# Patient Record
Sex: FEMALE | Race: WHITE | NOT HISPANIC OR LATINO | Employment: FULL TIME | URBAN - METROPOLITAN AREA
[De-identification: names, ages, dates, MRNs, and addresses within clinical notes are randomized per-mention and may not be internally consistent; named-entity substitution may affect disease eponyms.]

---

## 2020-07-22 ENCOUNTER — TELEPHONE (OUTPATIENT)
Dept: INTERNAL MEDICINE | Facility: CLINIC | Age: 65
End: 2020-07-22

## 2020-07-22 ENCOUNTER — OFFICE VISIT (OUTPATIENT)
Dept: INTERNAL MEDICINE | Facility: CLINIC | Age: 65
End: 2020-07-22

## 2020-07-22 VITALS
BODY MASS INDEX: 27.66 KG/M2 | HEIGHT: 72 IN | TEMPERATURE: 98.4 F | SYSTOLIC BLOOD PRESSURE: 112 MMHG | DIASTOLIC BLOOD PRESSURE: 78 MMHG | HEART RATE: 68 BPM | WEIGHT: 204.2 LBS

## 2020-07-22 DIAGNOSIS — Z13.29 SCREENING FOR THYROID DISORDER: ICD-10-CM

## 2020-07-22 DIAGNOSIS — Z13.220 LIPID SCREENING: ICD-10-CM

## 2020-07-22 DIAGNOSIS — Z13.21 ENCOUNTER FOR VITAMIN DEFICIENCY SCREENING: Primary | ICD-10-CM

## 2020-07-22 DIAGNOSIS — Z79.890 HORMONE REPLACEMENT THERAPY: ICD-10-CM

## 2020-07-22 DIAGNOSIS — Z79.890 HORMONE REPLACEMENT THERAPY: Primary | ICD-10-CM

## 2020-07-22 DIAGNOSIS — R63.5 WEIGHT GAIN: ICD-10-CM

## 2020-07-22 PROCEDURE — 99204 OFFICE O/P NEW MOD 45 MIN: CPT | Performed by: PHYSICIAN ASSISTANT

## 2020-07-22 RX ORDER — ALBUTEROL SULFATE 90 UG/1
2 AEROSOL, METERED RESPIRATORY (INHALATION) AS NEEDED
COMMUNITY

## 2020-07-22 RX ORDER — CHOLECALCIFEROL (VITAMIN D3) 1MM UNIT/G
LIQUID (GRAM) MISCELLANEOUS
COMMUNITY
End: 2021-12-13

## 2020-07-22 RX ORDER — LANOLIN ALCOHOL/MO/W.PET/CERES
1000 CREAM (GRAM) TOPICAL
COMMUNITY
End: 2021-11-29

## 2020-07-22 NOTE — PATIENT INSTRUCTIONS

## 2020-07-22 NOTE — ASSESSMENT & PLAN NOTE
Cut back on assist foods, high caloric foods, and sweets.  Continue regular cardiovascular exercise.  Shoot for greater than 10,000 steps a day.

## 2020-07-22 NOTE — ASSESSMENT & PLAN NOTE
Patient reports she had extensive labs in September 2019.  She has been using a vaginal tablet that has been compounded with estrogen, testosterone, and progesterone.  She has the bottle with her today.  Reports improvement of fatigue and increased energy.

## 2020-07-22 NOTE — PROGRESS NOTES
Patient Care Team:  Ricarda Perez PA-C as PCP - General (Internal Medicine)    Chief Complaint::   Chief Complaint   Patient presents with   • Establish Care        Subjective     HPI  64 year old female presents to establish care. Sister of Keanu Wiggins-moved from [facility] in 10/2019.    Pt lives with Keanu. Works at same location.  Has not establish care with any other physician since she moved.  Her last Pap smear was in June with Anali Beal.  Her last mammogram was performed in [place] September 2019.  She has no history of abnormal mammograms.  Last bone density was 2019.  Per patient, colonoscopy last performed greater than 10 years ago.  She has no history of polyps.  She did have a Cologuard performed 3 years ago.  Past medical history significant for seasonal bronchitis, hormone replacement therapy for fatigue, and thyroid nodule.  Per patient, thyroid nodule has resolved with last imaging and biopsy.  She currently takes a compounded vaginal tablet nightly. Estrogen .15,test .75/prog20 vaginal tabs 30  She is wanting to switch this to [place] [facility].  She reports she has improvement in fatigue and more energy since starting this.  She has had a 20 pound weight gain in the past year.  She does wear an apple watch and records 8-10,000 steps a day.  She eats mostly whole food diet.              The following portions of the patient's history were reviewed and updated as appropriate: active problem list, medication list, allergies, family history, social history    Review of Systems:   Review of Systems   Constitutional: Positive for activity change and appetite change. Negative for chills, fatigue, fever, unexpected weight gain and unexpected weight loss.   HENT: Negative for congestion, ear pain and sinus pressure.    Eyes: Negative for blurred vision and double vision.   Respiratory: Negative for cough, chest tightness, shortness of breath and wheezing.    Cardiovascular: Negative  "for chest pain and palpitations.   Gastrointestinal: Negative for abdominal distention, abdominal pain, blood in stool, constipation, diarrhea and nausea.   Endocrine: Negative for cold intolerance and heat intolerance.   Genitourinary: Negative for amenorrhea, breast discharge and breast lump.   Musculoskeletal: Negative for arthralgias and gait problem.   Skin: Negative for color change.   Allergic/Immunologic: Positive for environmental allergies. Negative for food allergies.   Neurological: Negative for dizziness, speech difficulty and headache.   Psychiatric/Behavioral: Negative for decreased concentration and dysphoric mood. The patient is not nervous/anxious.        Vital Signs  Vitals:    07/22/20 1541   BP: 112/78   BP Location: Left arm   Patient Position: Sitting   Cuff Size: Adult   Pulse: 68   Temp: 98.4 °F (36.9 °C)   TempSrc: Temporal   Weight: 92.6 kg (204 lb 3.2 oz)   Height: 182.9 cm (72\")  Comment: PER PATIENT   PainSc: 0-No pain     Body mass index is 27.69 kg/m².    Labs  No results found for any previous visit.       Imaging  No radiology results for the last 30 days.      Current Outpatient Medications:   •  albuterol sulfate HFA (ProAir HFA) 108 (90 Base) MCG/ACT inhaler, PRN, Disp: , Rfl:   •  Cetirizine HCl (ZYRTEC PO), Take  by mouth Daily., Disp: , Rfl:   •  Cholecalciferol (VITAMIN D3) liquid, Takes 2500 Units  2 drops daily, Disp: , Rfl:   •  DHEA 10 MG capsule, Take 1 capsule by mouth Daily., Disp: , Rfl:   •  MAGNESIUM CITRATE PO, Take  by mouth. Takes 1  150 mg daily, Disp: , Rfl:   •  Multiple Vitamin (MULTIVITAMINS PO), Take  by mouth. womens 55 Plus  1 daily, Disp: , Rfl:   •  Multiple Vitamins-Minerals (EYE VITAMINS PO), Take  by mouth. Macuguard  1 daily, Disp: , Rfl:   •  Unable to find, 1 each. Estrao.15/Testo.75/ prog 20   1 tablet vaginally daily at bedtime, Disp: , Rfl:   •  vitamin B-12 (CYANOCOBALAMIN) 1000 MCG tablet, Take 1,000 mcg by mouth. On Mon-Wed-Fri, Disp: , Rfl: "     Physical Exam:    Physical Exam   Constitutional: She is oriented to person, place, and time. She appears well-developed and well-nourished.   HENT:   Head: Normocephalic and atraumatic.   Right Ear: Hearing, tympanic membrane, external ear and ear canal normal.   Left Ear: Hearing, tympanic membrane, external ear and ear canal normal.   Nose: Nose normal.   Mouth/Throat: Uvula is midline, oropharynx is clear and moist and mucous membranes are normal.   Eyes: Pupils are equal, round, and reactive to light. Conjunctivae, EOM and lids are normal.   Neck: Normal range of motion and full passive range of motion without pain. Neck supple.   Cardiovascular: Normal rate, regular rhythm, normal heart sounds and intact distal pulses.   Pulmonary/Chest: Effort normal and breath sounds normal.   Abdominal: Soft. Normal appearance and bowel sounds are normal.   Musculoskeletal: Normal range of motion.   Neurological: She is alert and oriented to person, place, and time. She has normal strength and normal reflexes.   Skin: Skin is warm, dry and intact.   Psychiatric: She has a normal mood and affect. Her speech is normal and behavior is normal. Judgment and thought content normal. Cognition and memory are normal.   Vitals reviewed.      Procedures        Assessment/Plan   Problem List Items Addressed This Visit        Other    Encounter for vitamin deficiency screening - Primary    Relevant Orders    Vitamin D 25 Hydroxy    Vitamin B12    Lipid screening    Relevant Orders    Lipid Panel    Screening for thyroid disorder    Current Assessment & Plan     History of thyroid nodule.         Relevant Orders    TSH    T4, Free    Weight gain    Current Assessment & Plan     Cut back on assist foods, high caloric foods, and sweets.  Continue regular cardiovascular exercise.  Shoot for greater than 10,000 steps a day.         Relevant Orders    CBC & Differential    Comprehensive Metabolic Panel    TSH    T4, Free    Hormone  replacement therapy    Current Assessment & Plan     Patient reports she had extensive labs in September 2019.  She has been using a vaginal tablet that has been compounded with estrogen, testosterone, and progesterone.  She has the bottle with her today.  Reports improvement of fatigue and increased energy.            Return in 6 weeks for to routine medical exam.  Fasting labs prior to appointment.  Will call pharmacy in [place] to see if compounding prescription can be transferred.  Discussed importance of low-fat, low-cholesterol, low calorie diet for weight loss.  Continue regular cardiovascular exercise.  We will try and get record of colonoscopy, DEXA, and previous mammograms.    Return in about 6 weeks (around 9/2/2020) for Recheck.    Plan of care reviewed with patient at the conclusion of today's visit. Education was provided regarding diagnosis, management, and any prescribed or recommended OTC medications.Patient verbalizes understanding of and agreement with management plan.       Ricarda Perez PA-C    Please note that portions of this note were completed with a voice recognition program. Efforts were made to edit the dictations, but occasionally words are mistranscribed.  Answers for HPI/ROS submitted by the patient on 7/20/2020   What is the primary reason for your visit?: Other  Please describe your symptoms.: I have no symptoms.  This is a wellness check.  Have you had these symptoms before?: No  How long have you been having these symptoms?: 1-4 days

## 2020-08-10 DIAGNOSIS — Z79.890 HORMONE REPLACEMENT THERAPY: ICD-10-CM

## 2020-08-28 ENCOUNTER — LAB (OUTPATIENT)
Dept: LAB | Facility: HOSPITAL | Age: 65
End: 2020-08-28

## 2020-08-28 DIAGNOSIS — Z13.220 LIPID SCREENING: ICD-10-CM

## 2020-08-28 DIAGNOSIS — R63.5 WEIGHT GAIN: ICD-10-CM

## 2020-08-28 DIAGNOSIS — Z13.21 ENCOUNTER FOR VITAMIN DEFICIENCY SCREENING: ICD-10-CM

## 2020-08-28 DIAGNOSIS — Z13.29 SCREENING FOR THYROID DISORDER: ICD-10-CM

## 2020-08-28 LAB
25(OH)D3 SERPL-MCNC: 39.8 NG/ML (ref 30–100)
ALBUMIN SERPL-MCNC: 4.9 G/DL (ref 3.5–5.2)
ALBUMIN/GLOB SERPL: 1.6 G/DL
ALP SERPL-CCNC: 55 U/L (ref 39–117)
ALT SERPL W P-5'-P-CCNC: 35 U/L (ref 1–33)
ANION GAP SERPL CALCULATED.3IONS-SCNC: 13.5 MMOL/L (ref 5–15)
AST SERPL-CCNC: 31 U/L (ref 1–32)
BASOPHILS # BLD AUTO: 0.05 10*3/MM3 (ref 0–0.2)
BASOPHILS NFR BLD AUTO: 1.2 % (ref 0–1.5)
BILIRUB SERPL-MCNC: 0.8 MG/DL (ref 0–1.2)
BUN SERPL-MCNC: 13 MG/DL (ref 8–23)
BUN/CREAT SERPL: 18.3 (ref 7–25)
CALCIUM SPEC-SCNC: 9.9 MG/DL (ref 8.6–10.5)
CHLORIDE SERPL-SCNC: 102 MMOL/L (ref 98–107)
CHOLEST SERPL-MCNC: 298 MG/DL (ref 0–200)
CO2 SERPL-SCNC: 24.5 MMOL/L (ref 22–29)
CREAT SERPL-MCNC: 0.71 MG/DL (ref 0.57–1)
DEPRECATED RDW RBC AUTO: 42.2 FL (ref 37–54)
EOSINOPHIL # BLD AUTO: 0.15 10*3/MM3 (ref 0–0.4)
EOSINOPHIL NFR BLD AUTO: 3.7 % (ref 0.3–6.2)
ERYTHROCYTE [DISTWIDTH] IN BLOOD BY AUTOMATED COUNT: 12.1 % (ref 12.3–15.4)
GFR SERPL CREATININE-BSD FRML MDRD: 83 ML/MIN/1.73
GLOBULIN UR ELPH-MCNC: 3.1 GM/DL
GLUCOSE SERPL-MCNC: 79 MG/DL (ref 65–99)
HCT VFR BLD AUTO: 43.3 % (ref 34–46.6)
HDLC SERPL-MCNC: 80 MG/DL (ref 40–60)
HGB BLD-MCNC: 14.8 G/DL (ref 12–15.9)
IMM GRANULOCYTES # BLD AUTO: 0.01 10*3/MM3 (ref 0–0.05)
IMM GRANULOCYTES NFR BLD AUTO: 0.2 % (ref 0–0.5)
LDLC SERPL CALC-MCNC: 202 MG/DL (ref 0–100)
LDLC/HDLC SERPL: 2.52 {RATIO}
LYMPHOCYTES # BLD AUTO: 1.18 10*3/MM3 (ref 0.7–3.1)
LYMPHOCYTES NFR BLD AUTO: 28.9 % (ref 19.6–45.3)
MCH RBC QN AUTO: 32.7 PG (ref 26.6–33)
MCHC RBC AUTO-ENTMCNC: 34.2 G/DL (ref 31.5–35.7)
MCV RBC AUTO: 95.6 FL (ref 79–97)
MONOCYTES # BLD AUTO: 0.29 10*3/MM3 (ref 0.1–0.9)
MONOCYTES NFR BLD AUTO: 7.1 % (ref 5–12)
NEUTROPHILS NFR BLD AUTO: 2.4 10*3/MM3 (ref 1.7–7)
NEUTROPHILS NFR BLD AUTO: 58.9 % (ref 42.7–76)
NRBC BLD AUTO-RTO: 0.2 /100 WBC (ref 0–0.2)
PLATELET # BLD AUTO: 199 10*3/MM3 (ref 140–450)
PMV BLD AUTO: 11 FL (ref 6–12)
POTASSIUM SERPL-SCNC: 3.8 MMOL/L (ref 3.5–5.2)
PROT SERPL-MCNC: 8 G/DL (ref 6–8.5)
RBC # BLD AUTO: 4.53 10*6/MM3 (ref 3.77–5.28)
SODIUM SERPL-SCNC: 140 MMOL/L (ref 136–145)
T4 FREE SERPL-MCNC: 1.22 NG/DL (ref 0.93–1.7)
TRIGL SERPL-MCNC: 81 MG/DL (ref 0–150)
TSH SERPL DL<=0.05 MIU/L-ACNC: 1.57 UIU/ML (ref 0.27–4.2)
VIT B12 BLD-MCNC: 726 PG/ML (ref 211–946)
VLDLC SERPL-MCNC: 16.2 MG/DL (ref 5–40)
WBC # BLD AUTO: 4.08 10*3/MM3 (ref 3.4–10.8)

## 2020-08-28 PROCEDURE — 82306 VITAMIN D 25 HYDROXY: CPT

## 2020-08-28 PROCEDURE — 84443 ASSAY THYROID STIM HORMONE: CPT

## 2020-08-28 PROCEDURE — 85025 COMPLETE CBC W/AUTO DIFF WBC: CPT

## 2020-08-28 PROCEDURE — 82607 VITAMIN B-12: CPT

## 2020-08-28 PROCEDURE — 84439 ASSAY OF FREE THYROXINE: CPT

## 2020-08-28 PROCEDURE — 80061 LIPID PANEL: CPT

## 2020-08-28 PROCEDURE — 80053 COMPREHEN METABOLIC PANEL: CPT

## 2020-08-30 DIAGNOSIS — E78.2 MIXED HYPERLIPIDEMIA: Primary | ICD-10-CM

## 2020-08-30 RX ORDER — ROSUVASTATIN CALCIUM 10 MG/1
10 TABLET, COATED ORAL DAILY
Qty: 30 TABLET | Refills: 1 | Status: SHIPPED | OUTPATIENT
Start: 2020-08-30 | End: 2020-09-03

## 2020-09-03 ENCOUNTER — OFFICE VISIT (OUTPATIENT)
Dept: INTERNAL MEDICINE | Facility: CLINIC | Age: 65
End: 2020-09-03

## 2020-09-03 VITALS
HEART RATE: 60 BPM | HEIGHT: 72 IN | DIASTOLIC BLOOD PRESSURE: 80 MMHG | WEIGHT: 197.1 LBS | SYSTOLIC BLOOD PRESSURE: 132 MMHG | TEMPERATURE: 97.1 F | BODY MASS INDEX: 26.7 KG/M2

## 2020-09-03 DIAGNOSIS — R63.5 WEIGHT GAIN: ICD-10-CM

## 2020-09-03 DIAGNOSIS — Z12.39 BREAST CANCER SCREENING: ICD-10-CM

## 2020-09-03 DIAGNOSIS — E78.2 MIXED HYPERLIPIDEMIA: Primary | ICD-10-CM

## 2020-09-03 DIAGNOSIS — Z78.0 POST-MENOPAUSAL: ICD-10-CM

## 2020-09-03 DIAGNOSIS — Z12.11 COLON CANCER SCREENING: ICD-10-CM

## 2020-09-03 PROCEDURE — 99213 OFFICE O/P EST LOW 20 MIN: CPT | Performed by: PHYSICIAN ASSISTANT

## 2020-09-03 PROCEDURE — 99396 PREV VISIT EST AGE 40-64: CPT | Performed by: PHYSICIAN ASSISTANT

## 2020-09-03 RX ORDER — AMOXICILLIN 500 MG/1
1 CAPSULE ORAL 3 TIMES DAILY
COMMUNITY
Start: 2020-08-26 | End: 2021-01-18

## 2020-09-03 NOTE — PROGRESS NOTES
Patient Care Team:  Ricarda Perez PA-C as PCP - General (Internal Medicine)    Chief Complaint::   Chief Complaint   Patient presents with   • Annual Exam     patient has no c/o        Subjective     HPI  64 year old female with history of hyperlipidemia, weight gain, and on hormone replacement therapy presents for annual exam. Currently walking 3 miles a day. Has lost 7 pounds since last visit on 7/22/20.  Most comfortable weight at 180-185lb. Has been eating whole food diet, eliminating processed foods.    Had mammogram 10/2019. Due for DEXA.        The following portions of the patient's history were reviewed and updated as appropriate: active problem list, medication list, allergies, family history, social history    Review of Systems:   Review of Systems   Constitutional: Negative for activity change, appetite change, chills, diaphoresis, fatigue, fever, unexpected weight gain and unexpected weight loss.   HENT: Negative for congestion, ear pain, hearing loss and sinus pressure.    Eyes: Negative for visual disturbance.   Respiratory: Negative for cough, chest tightness, shortness of breath and wheezing.    Cardiovascular: Negative for chest pain, palpitations and leg swelling.   Gastrointestinal: Negative for abdominal pain, blood in stool, constipation, GERD and indigestion.   Endocrine: Negative for cold intolerance and heat intolerance.   Genitourinary: Negative for dysuria and hematuria.   Musculoskeletal: Negative for arthralgias and myalgias.   Skin: Negative for color change and skin lesions.   Allergic/Immunologic: Negative for environmental allergies.   Neurological: Negative for dizziness, tremors, seizures, syncope, speech difficulty, weakness, headache, memory problem and confusion.   Hematological: Does not bruise/bleed easily.   Psychiatric/Behavioral: Negative for decreased concentration, sleep disturbance and depressed mood. The patient is not nervous/anxious.        Vital  "Signs  Vitals:    09/03/20 1104 09/03/20 1140   BP: 142/86 132/80   BP Location: Left arm    Patient Position: Sitting    Cuff Size: Adult    Pulse: 60    Temp: 97.1 °F (36.2 °C)    Weight: 89.4 kg (197 lb 1.6 oz)    Height: 182.9 cm (72\")    PainSc: 0-No pain      Body mass index is 26.73 kg/m².    Labs  Lab on 08/28/2020   Component Date Value Ref Range Status   • Total Cholesterol 08/28/2020 298* 0 - 200 mg/dL Final   • Triglycerides 08/28/2020 81  0 - 150 mg/dL Final   • HDL Cholesterol 08/28/2020 80* 40 - 60 mg/dL Final   • LDL Cholesterol  08/28/2020 202* 0 - 100 mg/dL Final   • VLDL Cholesterol 08/28/2020 16.2  5 - 40 mg/dL Final   • LDL/HDL Ratio 08/28/2020 2.52   Final   • Glucose 08/28/2020 79  65 - 99 mg/dL Final   • BUN 08/28/2020 13  8 - 23 mg/dL Final   • Creatinine 08/28/2020 0.71  0.57 - 1.00 mg/dL Final   • Sodium 08/28/2020 140  136 - 145 mmol/L Final   • Potassium 08/28/2020 3.8  3.5 - 5.2 mmol/L Final   • Chloride 08/28/2020 102  98 - 107 mmol/L Final   • CO2 08/28/2020 24.5  22.0 - 29.0 mmol/L Final   • Calcium 08/28/2020 9.9  8.6 - 10.5 mg/dL Final   • Total Protein 08/28/2020 8.0  6.0 - 8.5 g/dL Final   • Albumin 08/28/2020 4.90  3.50 - 5.20 g/dL Final   • ALT (SGPT) 08/28/2020 35* 1 - 33 U/L Final   • AST (SGOT) 08/28/2020 31  1 - 32 U/L Final   • Alkaline Phosphatase 08/28/2020 55  39 - 117 U/L Final   • Total Bilirubin 08/28/2020 0.8  0.0 - 1.2 mg/dL Final   • eGFR Non African Amer 08/28/2020 83  >60 mL/min/1.73 Final   • Globulin 08/28/2020 3.1  gm/dL Final   • A/G Ratio 08/28/2020 1.6  g/dL Final   • BUN/Creatinine Ratio 08/28/2020 18.3  7.0 - 25.0 Final   • Anion Gap 08/28/2020 13.5  5.0 - 15.0 mmol/L Final   • 25 Hydroxy, Vitamin D 08/28/2020 39.8  30.0 - 100.0 ng/ml Final   • Vitamin B-12 08/28/2020 726  211 - 946 pg/mL Final   • TSH 08/28/2020 1.570  0.270 - 4.200 uIU/mL Final   • Free T4 08/28/2020 1.22  0.93 - 1.70 ng/dL Final   • WBC 08/28/2020 4.08  3.40 - 10.80 10*3/mm3 Final   • " RBC 08/28/2020 4.53  3.77 - 5.28 10*6/mm3 Final   • Hemoglobin 08/28/2020 14.8  12.0 - 15.9 g/dL Final   • Hematocrit 08/28/2020 43.3  34.0 - 46.6 % Final   • MCV 08/28/2020 95.6  79.0 - 97.0 fL Final   • MCH 08/28/2020 32.7  26.6 - 33.0 pg Final   • MCHC 08/28/2020 34.2  31.5 - 35.7 g/dL Final   • RDW 08/28/2020 12.1* 12.3 - 15.4 % Final   • RDW-SD 08/28/2020 42.2  37.0 - 54.0 fl Final   • MPV 08/28/2020 11.0  6.0 - 12.0 fL Final   • Platelets 08/28/2020 199  140 - 450 10*3/mm3 Final   • Neutrophil % 08/28/2020 58.9  42.7 - 76.0 % Final   • Lymphocyte % 08/28/2020 28.9  19.6 - 45.3 % Final   • Monocyte % 08/28/2020 7.1  5.0 - 12.0 % Final   • Eosinophil % 08/28/2020 3.7  0.3 - 6.2 % Final   • Basophil % 08/28/2020 1.2  0.0 - 1.5 % Final   • Immature Grans % 08/28/2020 0.2  0.0 - 0.5 % Final   • Neutrophils, Absolute 08/28/2020 2.40  1.70 - 7.00 10*3/mm3 Final   • Lymphocytes, Absolute 08/28/2020 1.18  0.70 - 3.10 10*3/mm3 Final   • Monocytes, Absolute 08/28/2020 0.29  0.10 - 0.90 10*3/mm3 Final   • Eosinophils, Absolute 08/28/2020 0.15  0.00 - 0.40 10*3/mm3 Final   • Basophils, Absolute 08/28/2020 0.05  0.00 - 0.20 10*3/mm3 Final   • Immature Grans, Absolute 08/28/2020 0.01  0.00 - 0.05 10*3/mm3 Final   • nRBC 08/28/2020 0.2  0.0 - 0.2 /100 WBC Final       Imaging  No radiology results for the last 30 days.      Current Outpatient Medications:   •  albuterol sulfate HFA (ProAir HFA) 108 (90 Base) MCG/ACT inhaler, PRN, Disp: , Rfl:   •  amoxicillin (AMOXIL) 500 MG capsule, Take 1 capsule by mouth 3 (Three) Times a Day., Disp: , Rfl:   •  Cetirizine HCl (ZYRTEC PO), Take  by mouth Daily., Disp: , Rfl:   •  Cholecalciferol (VITAMIN D3) liquid, Takes 2500 Units  2 drops daily, Disp: , Rfl:   •  DHEA 10 MG capsule, Take 1 capsule by mouth Daily., Disp: , Rfl:   •  estradiol 0.1 MG/GM cream 0.0015 g, Estradiol 0.15 mg Testosterone 0.75 mg progesterone 20 mg  Apply 1 ML once a day, Disp: 30 g, Rfl: 5  •  MAGNESIUM CITRATE  PO, Take  by mouth. Takes 1  150 mg daily, Disp: , Rfl:   •  Multiple Vitamin (MULTIVITAMINS PO), Take  by mouth. womens 55 Plus  1 daily, Disp: , Rfl:   •  Multiple Vitamins-Minerals (EYE VITAMINS PO), Take  by mouth. Macuguard  1 daily, Disp: , Rfl:   •  vitamin B-12 (CYANOCOBALAMIN) 1000 MCG tablet, Take 1,000 mcg by mouth. On Mon-Wed-Fri, Disp: , Rfl:     Physical Exam:    Physical Exam   Constitutional: She is oriented to person, place, and time. She appears well-developed and well-nourished.   HENT:   Head: Normocephalic and atraumatic.   Right Ear: Hearing, tympanic membrane, external ear and ear canal normal.   Left Ear: Hearing, tympanic membrane, external ear and ear canal normal.   Nose: Nose normal.   Mouth/Throat: Uvula is midline, oropharynx is clear and moist and mucous membranes are normal.   Eyes: Pupils are equal, round, and reactive to light. Conjunctivae, EOM and lids are normal.   Neck: Normal range of motion and full passive range of motion without pain. Neck supple.   Cardiovascular: Normal rate, regular rhythm, normal heart sounds and intact distal pulses.   Pulmonary/Chest: Effort normal and breath sounds normal.   Abdominal: Soft. Normal appearance and bowel sounds are normal.   Musculoskeletal: Normal range of motion.   Neurological: She is alert and oriented to person, place, and time. She has normal strength and normal reflexes.   Skin: Skin is warm, dry and intact.   Psychiatric: She has a normal mood and affect. Her speech is normal and behavior is normal. Judgment and thought content normal. Cognition and memory are normal.   Vitals reviewed.        ECG 12 Lead  Date/Time: 9/6/2020 6:01 PM  Performed by: Ricarda Perez PA-C  Authorized by: Ricarda Perez PA-C   Previous ECG: no previous ECG available  Rhythm: sinus rhythm  Conduction: incomplete right bundle branch block    Clinical impression: abnormal EKG                Assessment/Plan   Problem List Items Addressed  This Visit        Cardiovascular and Mediastinum    Mixed hyperlipidemia - Primary    Overview     LDL >200.  Pt has recent lab reports showing .             Current Assessment & Plan     Pt declines statin for now.  Will resume low fat, low cholesterol diet.           Relevant Orders    ECG 12 Lead       Other    Weight gain    Overview     Has lost 7 pounds in the past 6 weeks with diet and exercise.  Encouraged her to continue program.           Other Visit Diagnoses     Breast cancer screening        Relevant Orders    Mammo Screening Digital Tomosynthesis Bilateral With CAD    Post-menopausal        Relevant Orders    DEXA Bone Density Axial    Colon cancer screening        Relevant Orders    Cologuard - Stool, Per Rectum        Will obtain previous EKG from [place].  Will repeat at next visit.    Return in about 6 months (around 3/3/2021).    Patient Wellness Counseling:   Plan of care reviewed with patient at the conclusion of today's visit. Education was provided in regards to diagnosis, diet and exercise, physical activity, healthy weight,ways to reduce stress, adequate sleep, injury prevention,dental health, mental health, and immunizations.    Plan of care reviewed with patient at the conclusion of today's visit. Education was provided regarding diagnosis, management, and any prescribed or recommended OTC medications.Patient verbalizes understanding of and agreement with management plan.       Ricarda Perez PA-C    Please note that portions of this note were completed with a voice recognition program. Efforts were made to edit the dictations, but occasionally words are mistranscribed.  Answers for HPI/ROS submitted by the patient on 8/27/2020   What is the primary reason for your visit?: Physical

## 2020-09-06 PROBLEM — E78.2 MIXED HYPERLIPIDEMIA: Status: ACTIVE | Noted: 2020-09-06

## 2020-09-06 PROCEDURE — 93000 ELECTROCARDIOGRAM COMPLETE: CPT | Performed by: PHYSICIAN ASSISTANT

## 2020-09-06 NOTE — PATIENT INSTRUCTIONS

## 2020-09-08 ENCOUNTER — TELEPHONE (OUTPATIENT)
Dept: INTERNAL MEDICINE | Facility: CLINIC | Age: 65
End: 2020-09-08

## 2020-10-12 DIAGNOSIS — Z12.11 COLON CANCER SCREENING: ICD-10-CM

## 2020-12-04 ENCOUNTER — APPOINTMENT (OUTPATIENT)
Dept: BONE DENSITY | Facility: HOSPITAL | Age: 65
End: 2020-12-04

## 2020-12-04 ENCOUNTER — APPOINTMENT (OUTPATIENT)
Dept: MAMMOGRAPHY | Facility: HOSPITAL | Age: 65
End: 2020-12-04

## 2021-01-18 ENCOUNTER — TELEMEDICINE (OUTPATIENT)
Dept: FAMILY MEDICINE CLINIC | Facility: TELEHEALTH | Age: 66
End: 2021-01-18

## 2021-01-18 VITALS — WEIGHT: 190 LBS | HEIGHT: 71 IN | BODY MASS INDEX: 26.6 KG/M2

## 2021-01-18 DIAGNOSIS — R05.9 COUGH: Primary | ICD-10-CM

## 2021-01-18 PROBLEM — E04.1 THYROID NODULE: Status: ACTIVE | Noted: 2021-01-18

## 2021-01-18 PROBLEM — E66.3 OVERWEIGHT: Status: ACTIVE | Noted: 2021-01-18

## 2021-01-18 PROBLEM — R07.9 CHEST PAIN: Status: ACTIVE | Noted: 2019-06-24

## 2021-01-18 PROCEDURE — 99213 OFFICE O/P EST LOW 20 MIN: CPT | Performed by: NURSE PRACTITIONER

## 2021-01-18 RX ORDER — BENZONATATE 100 MG/1
CAPSULE ORAL
Qty: 30 CAPSULE | Refills: 0 | Status: SHIPPED | OUTPATIENT
Start: 2021-01-18 | End: 2021-11-29

## 2021-01-18 RX ORDER — GUAIFENESIN 600 MG/1
600 TABLET, EXTENDED RELEASE ORAL 2 TIMES DAILY
Qty: 28 TABLET | Refills: 0 | Status: SHIPPED | OUTPATIENT
Start: 2021-01-18 | End: 2021-02-01

## 2021-01-18 NOTE — PATIENT INSTRUCTIONS
Cough, Adult  Coughing is a reflex that clears your throat and your airways (respiratory system). Coughing helps to heal and protect your lungs. It is normal to cough occasionally, but a cough that happens with other symptoms or lasts a long time may be a sign of a condition that needs treatment. An acute cough may only last 2-3 weeks, while a chronic cough may last 8 or more weeks.  Coughing is commonly caused by:  · Infection of the respiratory systemby viruses or bacteria.  · Breathing in substances that irritate your lungs.  · Allergies.  · Asthma.  · Mucus that runs down the back of your throat (postnasal drip).  · Smoking.  · Acid backing up from the stomach into the esophagus (gastroesophageal reflux).  · Certain medicines.  · Chronic lung problems.  · Other medical conditions such as heart failure or a blood clot in the lung (pulmonary embolism).  Follow these instructions at home:  Medicines  · Take over-the-counter and prescription medicines only as told by your health care provider.  · Talk with your health care provider before you take a cough suppressant medicine.  Lifestyle    · Avoid cigarette smoke. Do not use any products that contain nicotine or tobacco, such as cigarettes, e-cigarettes, and chewing tobacco. If you need help quitting, ask your health care provider.  · Drink enough fluid to keep your urine pale yellow.  · Avoid caffeine.  · Do not drink alcohol if your health care provider tells you not to drink.  General instructions    · Pay close attention to changes in your cough. Tell your health care provider about them.  · Always cover your mouth when you cough.  · Avoid things that make you cough, such as perfume, candles, cleaning products, or campfire or tobacco smoke.  · If the air is dry, use a cool mist vaporizer or humidifier in your bedroom or your home to help loosen secretions.  · If your cough is worse at night, try to sleep in a semi-upright position.  · Rest as needed.  · Keep  all follow-up visits as told by your health care provider. This is important.  Contact a health care provider if you:  · Have new symptoms.  · Cough up pus.  · Have a cough that does not get better after 2-3 weeks or gets worse.  · Cannot control your cough with cough suppressant medicines and you are losing sleep.  · Have pain that gets worse or pain that is not helped with medicine.  · Have a fever.  · Have unexplained weight loss.  · Have night sweats.  Get help right away if:  · You cough up blood.  · You have difficulty breathing.  · Your heartbeat is very fast.  These symptoms may represent a serious problem that is an emergency. Do not wait to see if the symptoms will go away. Get medical help right away. Call your local emergency services (911 in the [place] Do not drive yourself to the hospital.  Summary  · Coughing is a reflex that clears your throat and your airways. It is normal to cough occasionally, but a cough that happens with other symptoms or lasts a long time may be a sign of a condition that needs treatment.  · Take over-the-counter and prescription medicines only as told by your health care provider.  · Always cover your mouth when you cough.  · Contact a health care provider if you have new symptoms or a cough that does not get better after 2-3 weeks or gets worse.  This information is not intended to replace advice given to you by your health care provider. Make sure you discuss any questions you have with your health care provider.  Document Revised: 01/06/2020 Document Reviewed: 01/06/2020  [employer]vier Patient Education © 2020 [employer]

## 2021-01-18 NOTE — PROGRESS NOTES
"CHIEF COMPLAINT  Cc:cough    HPI  Meri Maxwell is a 65 y.o. female  presents with complaint of a cough. She reports having a little cough since October. She reports that it started worsening a couple of days ago. She also reports that the cough is worse when she lies down. She does not feel sick at all. She coughs up a little on occasion. She also reports that this happens every year.She received the Moderna vaccine last week and reports that she has had no side effects from the vaccine.She has been using an albuterol inhaler as needed.    Review of Systems   Constitutional: Negative for fatigue and fever.   HENT: Negative for congestion and sore throat.    Respiratory: Positive for cough and wheezing. Negative for shortness of breath.         Chest feels a little heavy   Cardiovascular: Negative for chest pain.   Gastrointestinal: Negative for diarrhea, nausea and vomiting.   Musculoskeletal: Negative for myalgias.   Skin: Negative for rash.   Neurological: Negative for headaches.       History reviewed. No pertinent past medical history.    No family history on file.    Social History     Socioeconomic History   • Marital status:      Spouse name: Not on file   • Number of children: Not on file   • Years of education: Not on file   • Highest education level: Not on file   Tobacco Use   • Smoking status: Never Smoker   • Smokeless tobacco: Never Used         Ht 180.3 cm (71\")   Wt 86.2 kg (190 lb)   BMI 26.50 kg/m²     PHYSICAL EXAM n/a   Physical Exam   Constitutional: She is oriented to person, place, and time. She appears well-developed and well-nourished.   HENT:   Head: Normocephalic and atraumatic.   Right Ear: Hearing and external ear normal.   Left Ear: Hearing and external ear normal.   Nose: Nose normal.   Mouth/Throat: Mouth/Lips are normal.  Eyes: Lids are normal. Right eye exhibits no discharge and no exudate. Left eye exhibits no discharge and no exudate. Right conjunctiva is not injected. " Left conjunctiva is not injected.   Pulmonary/Chest: No accessory muscle usage. No tachypnea and no bradypnea.  No respiratory distress (dry cough noted, unintentional purse lipped breathing noted on expiration).No use of oxygen by nasal cannulaNo use of oxygen by mask noted.  Abdominal: Abdomen appears normal.   Neurological: She is alert and oriented to person, place, and time. No cranial nerve deficit.   Skin: Her skin appears normal.  Psychiatric: She has a normal mood and affect. Her speech is normal and behavior is normal. Judgment and thought content normal.    n/a    Results for orders placed or performed in visit on 08/28/20   Lipid Panel    Specimen: Blood   Result Value Ref Range    Total Cholesterol 298 (H) 0 - 200 mg/dL    Triglycerides 81 0 - 150 mg/dL    HDL Cholesterol 80 (H) 40 - 60 mg/dL    LDL Cholesterol  202 (H) 0 - 100 mg/dL    VLDL Cholesterol 16.2 5 - 40 mg/dL    LDL/HDL Ratio 2.52    Comprehensive Metabolic Panel    Specimen: Blood   Result Value Ref Range    Glucose 79 65 - 99 mg/dL    BUN 13 8 - 23 mg/dL    Creatinine 0.71 0.57 - 1.00 mg/dL    Sodium 140 136 - 145 mmol/L    Potassium 3.8 3.5 - 5.2 mmol/L    Chloride 102 98 - 107 mmol/L    CO2 24.5 22.0 - 29.0 mmol/L    Calcium 9.9 8.6 - 10.5 mg/dL    Total Protein 8.0 6.0 - 8.5 g/dL    Albumin 4.90 3.50 - 5.20 g/dL    ALT (SGPT) 35 (H) 1 - 33 U/L    AST (SGOT) 31 1 - 32 U/L    Alkaline Phosphatase 55 39 - 117 U/L    Total Bilirubin 0.8 0.0 - 1.2 mg/dL    eGFR Non African Amer 83 >60 mL/min/1.73    Globulin 3.1 gm/dL    A/G Ratio 1.6 g/dL    BUN/Creatinine Ratio 18.3 7.0 - 25.0    Anion Gap 13.5 5.0 - 15.0 mmol/L   Vitamin D 25 Hydroxy    Specimen: Blood   Result Value Ref Range    25 Hydroxy, Vitamin D 39.8 30.0 - 100.0 ng/ml   Vitamin B12    Specimen: Blood   Result Value Ref Range    Vitamin B-12 726 211 - 946 pg/mL   TSH    Specimen: Blood   Result Value Ref Range    TSH 1.570 0.270 - 4.200 uIU/mL   T4, Free    Specimen: Blood   Result  Value Ref Range    Free T4 1.22 0.93 - 1.70 ng/dL   CBC Auto Differential    Specimen: Blood   Result Value Ref Range    WBC 4.08 3.40 - 10.80 10*3/mm3    RBC 4.53 3.77 - 5.28 10*6/mm3    Hemoglobin 14.8 12.0 - 15.9 g/dL    Hematocrit 43.3 34.0 - 46.6 %    MCV 95.6 79.0 - 97.0 fL    MCH 32.7 26.6 - 33.0 pg    MCHC 34.2 31.5 - 35.7 g/dL    RDW 12.1 (L) 12.3 - 15.4 %    RDW-SD 42.2 37.0 - 54.0 fl    MPV 11.0 6.0 - 12.0 fL    Platelets 199 140 - 450 10*3/mm3    Neutrophil % 58.9 42.7 - 76.0 %    Lymphocyte % 28.9 19.6 - 45.3 %    Monocyte % 7.1 5.0 - 12.0 %    Eosinophil % 3.7 0.3 - 6.2 %    Basophil % 1.2 0.0 - 1.5 %    Immature Grans % 0.2 0.0 - 0.5 %    Neutrophils, Absolute 2.40 1.70 - 7.00 10*3/mm3    Lymphocytes, Absolute 1.18 0.70 - 3.10 10*3/mm3    Monocytes, Absolute 0.29 0.10 - 0.90 10*3/mm3    Eosinophils, Absolute 0.15 0.00 - 0.40 10*3/mm3    Basophils, Absolute 0.05 0.00 - 0.20 10*3/mm3    Immature Grans, Absolute 0.01 0.00 - 0.05 10*3/mm3    nRBC 0.2 0.0 - 0.2 /100 WBC       Diagnoses and all orders for this visit:    1. Cough (Primary)    Other orders  -     benzonatate (Tessalon Perles) 100 MG capsule; 1 to 2 capsules 3 times a day  Dispense: 30 capsule; Refill: 0  -     guaiFENesin (Mucinex) 600 MG 12 hr tablet; Take 1 tablet by mouth 2 (Two) Times a Day for 14 days.  Dispense: 28 tablet; Refill: 0      Mucinex with plenty of fluids especially water to thin secretions and help with clear congestion.  Monitor weight daily followup with PCP if fluctuations or pedal edema noted  Allergy pill of choice daily  Walk daily    FOLLOW UP  If symptoms worsen or persist follow up with PCP or Urgent Care for in person evaluation advised    Followup with PCP for general work up for heart and lungs advised    Patient verbalizes understanding of medication dosage, comfort measures, instructions for treatment and follow-up.    Estephania Dickinson, APRN  01/18/2021  11:14 EST    This visit was performed via Telehealth.  This  patient has been instructed to follow-up with their primary care provider if their symptoms worsen or the treatment provided does not resolve their illness.

## 2021-03-17 ENCOUNTER — APPOINTMENT (OUTPATIENT)
Dept: BONE DENSITY | Facility: HOSPITAL | Age: 66
End: 2021-03-17

## 2021-03-17 ENCOUNTER — APPOINTMENT (OUTPATIENT)
Dept: MAMMOGRAPHY | Facility: HOSPITAL | Age: 66
End: 2021-03-17

## 2021-05-28 ENCOUNTER — APPOINTMENT (OUTPATIENT)
Dept: MAMMOGRAPHY | Facility: HOSPITAL | Age: 66
End: 2021-05-28

## 2021-05-28 ENCOUNTER — APPOINTMENT (OUTPATIENT)
Dept: BONE DENSITY | Facility: HOSPITAL | Age: 66
End: 2021-05-28

## 2021-08-13 ENCOUNTER — TRANSCRIBE ORDERS (OUTPATIENT)
Dept: ADMINISTRATIVE | Facility: HOSPITAL | Age: 66
End: 2021-08-13

## 2021-08-13 DIAGNOSIS — Z12.31 VISIT FOR SCREENING MAMMOGRAM: Primary | ICD-10-CM

## 2021-08-16 ENCOUNTER — APPOINTMENT (OUTPATIENT)
Dept: BONE DENSITY | Facility: HOSPITAL | Age: 66
End: 2021-08-16

## 2021-08-16 ENCOUNTER — APPOINTMENT (OUTPATIENT)
Dept: MAMMOGRAPHY | Facility: HOSPITAL | Age: 66
End: 2021-08-16

## 2021-08-23 ENCOUNTER — TELEPHONE (OUTPATIENT)
Dept: INTERNAL MEDICINE | Facility: CLINIC | Age: 66
End: 2021-08-23

## 2021-08-23 NOTE — TELEPHONE ENCOUNTER
The pt has cancelled and rescheduled her DEXA 4 times. She has not rescheduled most recently. Order expires 9/6/21. Can we cancel?

## 2021-09-13 DIAGNOSIS — Z12.31 ENCOUNTER FOR SCREENING MAMMOGRAM FOR MALIGNANT NEOPLASM OF BREAST: ICD-10-CM

## 2021-09-13 DIAGNOSIS — Z78.0 POST-MENOPAUSAL: Primary | ICD-10-CM

## 2021-10-25 ENCOUNTER — APPOINTMENT (OUTPATIENT)
Dept: MAMMOGRAPHY | Facility: HOSPITAL | Age: 66
End: 2021-10-25

## 2021-11-18 ENCOUNTER — APPOINTMENT (OUTPATIENT)
Dept: MAMMOGRAPHY | Facility: HOSPITAL | Age: 66
End: 2021-11-18

## 2021-11-19 ENCOUNTER — TELEPHONE (OUTPATIENT)
Dept: INTERNAL MEDICINE | Facility: CLINIC | Age: 66
End: 2021-11-19

## 2021-11-19 DIAGNOSIS — Z13.29 SCREENING FOR THYROID DISORDER: ICD-10-CM

## 2021-11-19 DIAGNOSIS — E78.2 MIXED HYPERLIPIDEMIA: Primary | ICD-10-CM

## 2021-11-19 DIAGNOSIS — Z13.21 ENCOUNTER FOR VITAMIN DEFICIENCY SCREENING: ICD-10-CM

## 2021-11-29 ENCOUNTER — OFFICE VISIT (OUTPATIENT)
Dept: INTERNAL MEDICINE | Facility: CLINIC | Age: 66
End: 2021-11-29

## 2021-11-29 ENCOUNTER — LAB (OUTPATIENT)
Dept: LAB | Facility: HOSPITAL | Age: 66
End: 2021-11-29

## 2021-11-29 DIAGNOSIS — E78.2 MIXED HYPERLIPIDEMIA: ICD-10-CM

## 2021-11-29 DIAGNOSIS — Z00.00 ROUTINE MEDICAL EXAM: Primary | ICD-10-CM

## 2021-11-29 DIAGNOSIS — Z13.29 SCREENING FOR THYROID DISORDER: ICD-10-CM

## 2021-11-29 DIAGNOSIS — Z13.21 ENCOUNTER FOR VITAMIN DEFICIENCY SCREENING: ICD-10-CM

## 2021-11-29 DIAGNOSIS — E55.9 VITAMIN D DEFICIENCY: ICD-10-CM

## 2021-11-29 LAB
ALBUMIN UR-MCNC: <1.2 MG/DL
BASOPHILS # BLD AUTO: 0.06 10*3/MM3 (ref 0–0.2)
BASOPHILS NFR BLD AUTO: 1.2 % (ref 0–1.5)
DEPRECATED RDW RBC AUTO: 44 FL (ref 37–54)
EOSINOPHIL # BLD AUTO: 0.24 10*3/MM3 (ref 0–0.4)
EOSINOPHIL NFR BLD AUTO: 4.9 % (ref 0.3–6.2)
ERYTHROCYTE [DISTWIDTH] IN BLOOD BY AUTOMATED COUNT: 12.6 % (ref 12.3–15.4)
HCT VFR BLD AUTO: 41.2 % (ref 34–46.6)
HGB BLD-MCNC: 13.8 G/DL (ref 12–15.9)
IMM GRANULOCYTES # BLD AUTO: 0.01 10*3/MM3 (ref 0–0.05)
IMM GRANULOCYTES NFR BLD AUTO: 0.2 % (ref 0–0.5)
LYMPHOCYTES # BLD AUTO: 1.73 10*3/MM3 (ref 0.7–3.1)
LYMPHOCYTES NFR BLD AUTO: 35.2 % (ref 19.6–45.3)
MCH RBC QN AUTO: 32.1 PG (ref 26.6–33)
MCHC RBC AUTO-ENTMCNC: 33.5 G/DL (ref 31.5–35.7)
MCV RBC AUTO: 95.8 FL (ref 79–97)
MONOCYTES # BLD AUTO: 0.34 10*3/MM3 (ref 0.1–0.9)
MONOCYTES NFR BLD AUTO: 6.9 % (ref 5–12)
NEUTROPHILS NFR BLD AUTO: 2.54 10*3/MM3 (ref 1.7–7)
NEUTROPHILS NFR BLD AUTO: 51.6 % (ref 42.7–76)
NRBC BLD AUTO-RTO: 0 /100 WBC (ref 0–0.2)
PLATELET # BLD AUTO: 182 10*3/MM3 (ref 140–450)
PMV BLD AUTO: 10.9 FL (ref 6–12)
RBC # BLD AUTO: 4.3 10*6/MM3 (ref 3.77–5.28)
WBC NRBC COR # BLD: 4.92 10*3/MM3 (ref 3.4–10.8)

## 2021-11-29 PROCEDURE — 84439 ASSAY OF FREE THYROXINE: CPT

## 2021-11-29 PROCEDURE — 80053 COMPREHEN METABOLIC PANEL: CPT

## 2021-11-29 PROCEDURE — 82043 UR ALBUMIN QUANTITATIVE: CPT

## 2021-11-29 PROCEDURE — 99213 OFFICE O/P EST LOW 20 MIN: CPT | Performed by: PHYSICIAN ASSISTANT

## 2021-11-29 PROCEDURE — 82306 VITAMIN D 25 HYDROXY: CPT

## 2021-11-29 PROCEDURE — 82607 VITAMIN B-12: CPT

## 2021-11-29 PROCEDURE — 84481 FREE ASSAY (FT-3): CPT

## 2021-11-29 PROCEDURE — 99396 PREV VISIT EST AGE 40-64: CPT | Performed by: PHYSICIAN ASSISTANT

## 2021-11-29 PROCEDURE — 80061 LIPID PANEL: CPT

## 2021-11-29 PROCEDURE — 84443 ASSAY THYROID STIM HORMONE: CPT

## 2021-11-29 PROCEDURE — 85025 COMPLETE CBC W/AUTO DIFF WBC: CPT

## 2021-11-30 LAB
25(OH)D3 SERPL-MCNC: 24.7 NG/ML (ref 30–100)
ALBUMIN SERPL-MCNC: 4.3 G/DL (ref 3.5–5.2)
ALBUMIN/GLOB SERPL: 1.5 G/DL
ALP SERPL-CCNC: 66 U/L (ref 39–117)
ALT SERPL W P-5'-P-CCNC: 17 U/L (ref 1–33)
ANION GAP SERPL CALCULATED.3IONS-SCNC: 5.9 MMOL/L (ref 5–15)
AST SERPL-CCNC: 21 U/L (ref 1–32)
BILIRUB SERPL-MCNC: 0.4 MG/DL (ref 0–1.2)
BUN SERPL-MCNC: 12 MG/DL (ref 8–23)
BUN/CREAT SERPL: 16.4 (ref 7–25)
CALCIUM SPEC-SCNC: 9 MG/DL (ref 8.6–10.5)
CHLORIDE SERPL-SCNC: 106 MMOL/L (ref 98–107)
CHOLEST SERPL-MCNC: 248 MG/DL (ref 0–200)
CO2 SERPL-SCNC: 28.1 MMOL/L (ref 22–29)
CREAT SERPL-MCNC: 0.73 MG/DL (ref 0.57–1)
GFR SERPL CREATININE-BSD FRML MDRD: 80 ML/MIN/1.73
GLOBULIN UR ELPH-MCNC: 2.9 GM/DL
GLUCOSE SERPL-MCNC: 84 MG/DL (ref 65–99)
HDLC SERPL-MCNC: 73 MG/DL (ref 40–60)
LDLC SERPL CALC-MCNC: 156 MG/DL (ref 0–100)
LDLC/HDLC SERPL: 2.1 {RATIO}
POTASSIUM SERPL-SCNC: 4 MMOL/L (ref 3.5–5.2)
PROT SERPL-MCNC: 7.2 G/DL (ref 6–8.5)
SODIUM SERPL-SCNC: 140 MMOL/L (ref 136–145)
T3FREE SERPL-MCNC: 3.12 PG/ML (ref 2–4.4)
T4 FREE SERPL-MCNC: 1.01 NG/DL (ref 0.93–1.7)
TRIGL SERPL-MCNC: 108 MG/DL (ref 0–150)
TSH SERPL DL<=0.05 MIU/L-ACNC: 1.24 UIU/ML (ref 0.27–4.2)
VIT B12 BLD-MCNC: 519 PG/ML (ref 211–946)
VLDLC SERPL-MCNC: 19 MG/DL (ref 5–40)

## 2021-12-01 VITALS
DIASTOLIC BLOOD PRESSURE: 88 MMHG | HEIGHT: 71 IN | TEMPERATURE: 98.2 F | BODY MASS INDEX: 29.01 KG/M2 | OXYGEN SATURATION: 98 % | SYSTOLIC BLOOD PRESSURE: 138 MMHG | WEIGHT: 207.2 LBS | HEART RATE: 69 BPM

## 2021-12-01 PROBLEM — E55.9 VITAMIN D DEFICIENCY: Status: ACTIVE | Noted: 2021-12-01

## 2021-12-01 PROBLEM — Z00.00 ROUTINE MEDICAL EXAM: Status: ACTIVE | Noted: 2021-12-01

## 2021-12-01 PROCEDURE — 93000 ELECTROCARDIOGRAM COMPLETE: CPT | Performed by: PHYSICIAN ASSISTANT

## 2021-12-13 RX ORDER — ERGOCALCIFEROL 1.25 MG/1
50000 CAPSULE ORAL WEEKLY
Qty: 4 CAPSULE | Refills: 3 | Status: SHIPPED | OUTPATIENT
Start: 2021-12-13 | End: 2022-03-02

## 2021-12-15 ENCOUNTER — HOSPITAL ENCOUNTER (OUTPATIENT)
Dept: BONE DENSITY | Facility: HOSPITAL | Age: 66
Discharge: HOME | End: 2021-12-15

## 2021-12-15 ENCOUNTER — APPOINTMENT (OUTPATIENT)
Dept: OTHER | Facility: HOSPITAL | Age: 66
End: 2021-12-15

## 2021-12-15 ENCOUNTER — HOSPITAL ENCOUNTER (OUTPATIENT)
Dept: MAMMOGRAPHY | Facility: HOSPITAL | Age: 66
Discharge: HOME | End: 2021-12-15

## 2021-12-15 DIAGNOSIS — Z12.31 ENCOUNTER FOR SCREENING MAMMOGRAM FOR MALIGNANT NEOPLASM OF BREAST: ICD-10-CM

## 2021-12-15 DIAGNOSIS — Z78.0 POST-MENOPAUSAL: ICD-10-CM

## 2021-12-15 PROCEDURE — 77067 SCR MAMMO BI INCL CAD: CPT

## 2021-12-15 PROCEDURE — 77067 SCR MAMMO BI INCL CAD: CPT | Performed by: RADIOLOGY

## 2021-12-15 PROCEDURE — 77063 BREAST TOMOSYNTHESIS BI: CPT | Performed by: RADIOLOGY

## 2021-12-15 PROCEDURE — 77080 DXA BONE DENSITY AXIAL: CPT

## 2021-12-15 PROCEDURE — 77063 BREAST TOMOSYNTHESIS BI: CPT

## 2022-01-28 DIAGNOSIS — R03.0 ELEVATED BLOOD PRESSURE READING: Primary | ICD-10-CM

## 2022-01-28 RX ORDER — AMLODIPINE BESYLATE 2.5 MG/1
2.5 TABLET ORAL DAILY
Qty: 30 TABLET | Refills: 0 | Status: SHIPPED | OUTPATIENT
Start: 2022-01-28 | End: 2022-02-22

## 2022-02-22 DIAGNOSIS — R03.0 ELEVATED BLOOD PRESSURE READING: ICD-10-CM

## 2022-02-22 RX ORDER — AMLODIPINE BESYLATE 2.5 MG/1
TABLET ORAL
Qty: 30 TABLET | Refills: 2 | Status: SHIPPED | OUTPATIENT
Start: 2022-02-22 | End: 2022-05-19 | Stop reason: SDUPTHER

## 2022-03-02 RX ORDER — ERGOCALCIFEROL 1.25 MG/1
CAPSULE ORAL
Qty: 12 CAPSULE | Refills: 1 | Status: SHIPPED | OUTPATIENT
Start: 2022-03-02

## 2022-03-02 NOTE — TELEPHONE ENCOUNTER
Rx Refill Note  Requested Prescriptions     Pending Prescriptions Disp Refills   • vitamin D (ERGOCALCIFEROL) 1.25 MG (99984 UT) capsule capsule [Pharmacy Med Name: VITAMIN D2 1.25MG(50,000 UNIT)] 12 capsule 1     Sig: TAKE 1 CAPSULE BY MOUTH 1 TIME PER WEEK.      Last office visit with prescribing clinician: 11/29/2021      Next office visit with prescribing clinician: 3/7/2022            Mera Blandon LPN  03/02/22, 08:34 EST

## 2022-05-19 DIAGNOSIS — R03.0 ELEVATED BLOOD PRESSURE READING: ICD-10-CM

## 2022-05-19 RX ORDER — AMLODIPINE BESYLATE 2.5 MG/1
2.5 TABLET ORAL DAILY
Qty: 30 TABLET | Refills: 2 | Status: SHIPPED | OUTPATIENT
Start: 2022-05-19 | End: 2022-08-15

## 2022-07-01 ENCOUNTER — LAB (OUTPATIENT)
Dept: LAB | Facility: HOSPITAL | Age: 67
End: 2022-07-01

## 2022-07-01 ENCOUNTER — OFFICE VISIT (OUTPATIENT)
Dept: INTERNAL MEDICINE | Facility: CLINIC | Age: 67
End: 2022-07-01

## 2022-07-01 VITALS
HEART RATE: 68 BPM | BODY MASS INDEX: 26.6 KG/M2 | SYSTOLIC BLOOD PRESSURE: 120 MMHG | DIASTOLIC BLOOD PRESSURE: 80 MMHG | WEIGHT: 190 LBS | TEMPERATURE: 98.2 F | OXYGEN SATURATION: 99 % | HEIGHT: 71 IN

## 2022-07-01 DIAGNOSIS — E78.2 MIXED HYPERLIPIDEMIA: ICD-10-CM

## 2022-07-01 DIAGNOSIS — Z13.29 SCREENING FOR THYROID DISORDER: ICD-10-CM

## 2022-07-01 DIAGNOSIS — Z00.00 ROUTINE MEDICAL EXAM: Primary | ICD-10-CM

## 2022-07-01 DIAGNOSIS — E55.9 VITAMIN D DEFICIENCY: ICD-10-CM

## 2022-07-01 DIAGNOSIS — I10 PRIMARY HYPERTENSION: ICD-10-CM

## 2022-07-01 LAB
25(OH)D3 SERPL-MCNC: 41.1 NG/ML (ref 30–100)
ALBUMIN SERPL-MCNC: 4.7 G/DL (ref 3.5–5.2)
ALBUMIN/GLOB SERPL: 1.5 G/DL
ALP SERPL-CCNC: 79 U/L (ref 39–117)
ALT SERPL W P-5'-P-CCNC: 30 U/L (ref 1–33)
ANION GAP SERPL CALCULATED.3IONS-SCNC: 13 MMOL/L (ref 5–15)
AST SERPL-CCNC: 28 U/L (ref 1–32)
BASOPHILS # BLD AUTO: 0.02 10*3/MM3 (ref 0–0.2)
BASOPHILS NFR BLD AUTO: 0.4 % (ref 0–1.5)
BILIRUB SERPL-MCNC: 0.7 MG/DL (ref 0–1.2)
BUN SERPL-MCNC: 12 MG/DL (ref 8–23)
BUN/CREAT SERPL: 14.5 (ref 7–25)
CALCIUM SPEC-SCNC: 9.3 MG/DL (ref 8.6–10.5)
CHLORIDE SERPL-SCNC: 105 MMOL/L (ref 98–107)
CO2 SERPL-SCNC: 24 MMOL/L (ref 22–29)
CREAT SERPL-MCNC: 0.83 MG/DL (ref 0.57–1)
DEPRECATED RDW RBC AUTO: 44.1 FL (ref 37–54)
EGFRCR SERPLBLD CKD-EPI 2021: 78.3 ML/MIN/1.73
EOSINOPHIL # BLD AUTO: 0.28 10*3/MM3 (ref 0–0.4)
EOSINOPHIL NFR BLD AUTO: 5.1 % (ref 0.3–6.2)
ERYTHROCYTE [DISTWIDTH] IN BLOOD BY AUTOMATED COUNT: 12.8 % (ref 12.3–15.4)
GLOBULIN UR ELPH-MCNC: 3.1 GM/DL
GLUCOSE SERPL-MCNC: 82 MG/DL (ref 65–99)
HCT VFR BLD AUTO: 40.4 % (ref 34–46.6)
HGB BLD-MCNC: 13.4 G/DL (ref 12–15.9)
IMM GRANULOCYTES # BLD AUTO: 0.01 10*3/MM3 (ref 0–0.05)
IMM GRANULOCYTES NFR BLD AUTO: 0.2 % (ref 0–0.5)
LYMPHOCYTES # BLD AUTO: 1.78 10*3/MM3 (ref 0.7–3.1)
LYMPHOCYTES NFR BLD AUTO: 32.5 % (ref 19.6–45.3)
MCH RBC QN AUTO: 31.9 PG (ref 26.6–33)
MCHC RBC AUTO-ENTMCNC: 33.2 G/DL (ref 31.5–35.7)
MCV RBC AUTO: 96.2 FL (ref 79–97)
MONOCYTES # BLD AUTO: 0.45 10*3/MM3 (ref 0.1–0.9)
MONOCYTES NFR BLD AUTO: 8.2 % (ref 5–12)
NEUTROPHILS NFR BLD AUTO: 2.94 10*3/MM3 (ref 1.7–7)
NEUTROPHILS NFR BLD AUTO: 53.6 % (ref 42.7–76)
NRBC BLD AUTO-RTO: 0 /100 WBC (ref 0–0.2)
PLATELET # BLD AUTO: 212 10*3/MM3 (ref 140–450)
PMV BLD AUTO: 11.6 FL (ref 6–12)
POTASSIUM SERPL-SCNC: 3.9 MMOL/L (ref 3.5–5.2)
PROT SERPL-MCNC: 7.8 G/DL (ref 6–8.5)
RBC # BLD AUTO: 4.2 10*6/MM3 (ref 3.77–5.28)
SODIUM SERPL-SCNC: 142 MMOL/L (ref 136–145)
TSH SERPL DL<=0.05 MIU/L-ACNC: 1.33 UIU/ML (ref 0.27–4.2)
WBC NRBC COR # BLD: 5.48 10*3/MM3 (ref 3.4–10.8)

## 2022-07-01 PROCEDURE — 1159F MED LIST DOCD IN RCRD: CPT | Performed by: PHYSICIAN ASSISTANT

## 2022-07-01 PROCEDURE — 80053 COMPREHEN METABOLIC PANEL: CPT | Performed by: PHYSICIAN ASSISTANT

## 2022-07-01 PROCEDURE — 85025 COMPLETE CBC W/AUTO DIFF WBC: CPT | Performed by: PHYSICIAN ASSISTANT

## 2022-07-01 PROCEDURE — 82306 VITAMIN D 25 HYDROXY: CPT | Performed by: PHYSICIAN ASSISTANT

## 2022-07-01 PROCEDURE — 1170F FXNL STATUS ASSESSED: CPT | Performed by: PHYSICIAN ASSISTANT

## 2022-07-01 PROCEDURE — 99213 OFFICE O/P EST LOW 20 MIN: CPT | Performed by: PHYSICIAN ASSISTANT

## 2022-07-01 PROCEDURE — G0403 EKG FOR INITIAL PREVENT EXAM: HCPCS | Performed by: PHYSICIAN ASSISTANT

## 2022-07-01 PROCEDURE — 84443 ASSAY THYROID STIM HORMONE: CPT | Performed by: PHYSICIAN ASSISTANT

## 2022-07-01 PROCEDURE — 1126F AMNT PAIN NOTED NONE PRSNT: CPT | Performed by: PHYSICIAN ASSISTANT

## 2022-07-01 PROCEDURE — G0402 INITIAL PREVENTIVE EXAM: HCPCS | Performed by: PHYSICIAN ASSISTANT

## 2022-07-01 NOTE — PATIENT INSTRUCTIONS
Medicare Wellness  Personal Prevention Plan of Service     Date of Office Visit:    Encounter Provider:  Ricarda Perez PA-C  Place of Service:  [facility] INTER[facility] MEDICINE  Patient Name: Meri Maxwell  :  1955    As part of the Medicare Wellness portion of your visit today, we are providing you with this personalized preventive plan of services (PPPS). This plan is based upon recommendations of the [place] Preventive Services Task Force (USPSTF) and the Advisory Committee on Immunization Practices (ACIP).    This lists the preventive care services that should be considered, and provides dates of when you are due. Items listed as completed are up-to-date and do not require any further intervention.    Health Maintenance   Topic Date Due    COLORECTAL CANCER SCREENING  Never done    ZOSTER VACCINE (1 of 2) Never done    HEPATITIS C SCREENING  Never done    ANNUAL WELLNESS VISIT  Never done    PAP SMEAR  Never done    COVID-19 Vaccine (4 - Booster for Moderna series) 2022    INFLUENZA VACCINE  10/01/2022    LIPID PANEL  2022    MAMMOGRAM  12/15/2023    DXA SCAN  12/15/2023    TDAP/TD VACCINES (2 - Td or Tdap) 10/14/2026       Orders Placed This Encounter   Procedures    Comprehensive Metabolic Panel     Standing Status:   Future     Number of Occurrences:   1     Standing Expiration Date:   2023     Order Specific Question:   Release to patient     Answer:   Immediate    TSH     Standing Status:   Future     Number of Occurrences:   1     Standing Expiration Date:   2023     Order Specific Question:   Release to patient     Answer:   Immediate    Vitamin D 25 Hydroxy     Standing Status:   Future     Number of Occurrences:   1     Standing Expiration Date:   2023     Order Specific Question:   Release to patient     Answer:   Immediate    ECG 12 Lead     This order was created via procedure documentation     Order Specific Question:   Release to  "patient     Answer:   Immediate    CBC & Differential     Standing Status:   Future     Number of Occurrences:   1     Standing Expiration Date:   7/1/2023     Order Specific Question:   Manual Differential     Answer:   No       Return in about 6 months (around 1/1/2023).        Critical care medicine: Principles of diagnosis and management in the adult (4th ed., pp. 3412-5772). Lopez.\"> Mohamud's anesthesia (8th ed., pp. 232-250). Lopez.\">   Advance Directive    Advance directives are legal documents that allow you to make decisions about your health care and medical treatment in case you become unable to communicate for yourself. Advance directives let your wishes be known to family, friends, and health care providers.  Discussing and writing advance directives should happen over time rather than all at once. Advance directives can be changed and updated at any time. There are different types of advance directives, such as:  Medical power of .  Living will.  Do not resuscitate (DNR) order or do not attempt resuscitation (DNAR) order.  Health care proxy and medical power of   A health care proxy is also called a health care agent. This person is appointed to make medical decisions for you when you are unable to make decisions for yourself. Generally, people ask a trusted friend or family member to act as their proxy and represent their preferences. Make sure you have an agreement with your trusted person to act as your proxy. A proxy may have to make a medical decision on your behalf if your wishes are not known.  A medical power of , also called a durable power of  for health care, is a legal document that names your health care proxy. Depending on the laws in your state, the document may need to be:  Signed.  Notarized.  Dated.  Copied.  Witnessed.  Incorporated into your medical record.  You may also want to appoint a trusted person to manage your money in the event you are " unable to do so. This is called a durable power of  for finances. It is a separate legal document from the durable power of  for health care. You may choose your health care proxy or someone different to act as your agent in money matters.  If you do not appoint a proxy, or there is a concern that the proxy is not acting in your best interest, a court may appoint a guardian to act on your behalf.  Living will  A living will is a set of instructions that state your wishes about medical care when you cannot express them yourself. Health care providers should keep a copy of your living will in your medical record. You may want to give a copy to family members or friends. To alert caregivers in case of an emergency, you can place a card in your wallet to let them know that you have a living will and where they can find it. A living will is used if you become:  Terminally ill.  Disabled.  Unable to communicate or make decisions.  The following decisions should be included in your living will:  To use or not to use life support equipment, such as dialysis machines and breathing machines (ventilators).  Whether you want a DNR or DNAR order. This tells health care providers not to use cardiopulmonary resuscitation (CPR) if breathing or heartbeat stops.  To use or not to use tube feeding.  To be given or not to be given food and fluids.  Whether you want comfort (palliative) care when the goal becomes comfort rather than a cure.  Whether you want to donate your organs and tissues.  A living will does not give instructions for distributing your money and property if you should pass away.  DNR or DNAR  A DNR or DNAR order is a request not to have CPR in the event that your heart stops beating or you stop breathing. If a DNR or DNAR order has not been made and shared, a health care provider will try to help any patient whose heart has stopped or who has stopped breathing. If you plan to have surgery, talk with  your health care provider about how your DNR or DNAR order will be followed if problems occur.  What if I do not have an advance directive?  Some states assign family decision makers to act on your behalf if you do not have an advance directive. Each state has its own laws about advance directives. You may want to check with your health care provider, , or state representative about the laws in your state.  Summary  Advance directives are legal documents that allow you to make decisions about your health care and medical treatment in case you become unable to communicate for yourself.  The process of discussing and writing advance directives should happen over time. You can change and update advance directives at any time.  Advance directives may include a medical power of , a living will, and a DNR or DNAR order.  This information is not intended to replace advice given to you by your health care provider. Make sure you discuss any questions you have with your health care provider.  Document Revised: 2021 Document Reviewed: 2021  [employer] Patient Education ©  [employer]    Medicare Wellness  Personal Prevention Plan of Service     Date of Office Visit:    Encounter Provider:  Ricarda Perez PA-C  Place of Service:  [facility] MED[facility]  Patient Name: Meri Maxwell  :  1955    As part of the Medicare Wellness portion of your visit today, we are providing you with this personalized preventive plan of services (PPPS). This plan is based upon recommendations of the [place] Preventive Services Task Force (USPSTF) and the Advisory Committee on Immunization Practices (ACIP).    This lists the preventive care services that should be considered, and provides dates of when you are due. Items listed as completed are up-to-date and do not require any further intervention.    Health Maintenance   Topic Date Due    COLORECTAL CANCER  SCREENING  Never done    ZOSTER VACCINE (1 of 2) Never done    HEPATITIS C SCREENING  Never done    ANNUAL WELLNESS VISIT  Never done    PAP SMEAR  Never done    COVID-19 Vaccine (4 - Booster for Moderna series) 02/28/2022    INFLUENZA VACCINE  10/01/2022    LIPID PANEL  11/29/2022    MAMMOGRAM  12/15/2023    DXA SCAN  12/15/2023    TDAP/TD VACCINES (2 - Td or Tdap) 10/14/2026       Orders Placed This Encounter   Procedures    Comprehensive Metabolic Panel     Standing Status:   Future     Standing Expiration Date:   7/1/2023     Order Specific Question:   Release to patient     Answer:   Immediate    Lipid Panel     Standing Status:   Future     Standing Expiration Date:   7/1/2023    TSH     Standing Status:   Future     Standing Expiration Date:   7/1/2023     Order Specific Question:   Release to patient     Answer:   Immediate    Vitamin D 25 Hydroxy     Standing Status:   Future     Standing Expiration Date:   7/1/2023     Order Specific Question:   Release to patient     Answer:   Immediate    CBC & Differential     Standing Status:   Future     Standing Expiration Date:   7/1/2023     Order Specific Question:   Manual Differential     Answer:   No       No follow-ups on file.        BMI for Adults  What is BMI?  Body mass index (BMI) is a number that is calculated from a person's weight and height. BMI can help estimate how much of a person's weight is composed of fat. BMI does not measure body fat directly. Rather, it is an alternative to procedures that directly measure body fat, which can be difficult and expensive.  BMI can help identify people who may be at higher risk for certain medical problems.  What are BMI measurements used for?  BMI is used as a screening tool to identify possible weight problems. It helps determine whether a person is obese, overweight, a healthy weight, or underweight.  BMI is useful for:  Identifying a weight problem that may be related to a medical condition or may increase  "the risk for medical problems.  Promoting changes, such as changes in diet and exercise, to help reach a healthy weight. BMI screening can be repeated to see if these changes are working.  How is BMI calculated?  BMI involves measuring your weight in relation to your height. Both height and weight are measured, and the BMI is calculated from those numbers. This can be done either in English ([place] or metric measurements. Note that charts and online BMI calculators are available to help you find your BMI quickly and easily without having to do these calculations yourself.  To calculate your BMI in English ([place] measurements:    Measure your weight in pounds (lb).  Multiply the number of pounds by 703.  For example, for a person who weighs 180 lb, multiply that number by 703, which equals 126,540.  Measure your height in inches. Then multiply that number by itself to get a measurement called \"inches squared.\"  For example, for a person who is 70 inches tall, the \"inches squared\" measurement is 70 inches x 70 inches, which equals 4,900 inches squared.  Divide the total from step 2 (number of lb x 703) by the total from step 3 (inches squared): 126,540 ÷ 4,900 = 25.8. This is your BMI.    To calculate your BMI in metric measurements:  Measure your weight in kilograms (kg).  Measure your height in meters (m). Then multiply that number by itself to get a measurement called \"meters squared.\"  For example, for a person who is 1.75 m tall, the \"meters squared\" measurement is 1.75 m x 1.75 m, which is equal to 3.1 meters squared.  Divide the number of kilograms (your weight) by the meters squared number. In this example: 70 ÷ 3.1 = 22.6. This is your BMI.  What do the results mean?  BMI charts are used to identify whether you are underweight, normal weight, overweight, or obese. The following guidelines will be used:  Underweight: BMI less than 18.5.  Normal weight: BMI between 18.5 and 24.9.  Overweight: BMI between 25 and " 29.9.  Obese: BMI of 30 or above.  Keep these notes in mind:  Weight includes both fat and muscle, so someone with a muscular build, such as an athlete, may have a BMI that is higher than 24.9. In cases like these, BMI is not an accurate measure of body fat.  To determine if excess body fat is the cause of a BMI of 25 or higher, further assessments may need to be done by a health care provider.  BMI is usually interpreted in the same way for men and women.  Where to find more information  For more information about BMI, including tools to quickly calculate your BMI, go to these websites:  Centers for Disease Control and Prevention: www.cdc.gov  American Heart Association: www.heart.org  National Heart, Lung, and [employer] [place]: www.nhlbi.nih.gov  Summary  Body mass index (BMI) is a number that is calculated from a person's weight and height.  BMI may help estimate how much of a person's weight is composed of fat. BMI can help identify those who may be at higher risk for certain medical problems.  BMI can be measured using English measurements or metric measurements.  BMI charts are used to identify whether you are underweight, normal weight, overweight, or obese.  This information is not intended to replace advice given to you by your health care provider. Make sure you discuss any questions you have with your health care provider.  Document Revised: 09/09/2020 Document Reviewed: 07/17/2020  [employer] Patient Education © 2021 [employer]

## 2022-07-01 NOTE — PROGRESS NOTES
QUICK REFERENCE INFORMATION:  The ABCs of the Annual Wellness Visit    Welcome to Medicare Visit    HEALTH RISK ASSESSMENT    1955    Recent Hospitalizations:  No hospitalization(s) within the last year..      Current Medical Providers:  Patient Care Team:  Ricarda Perez PA-C as PCP - General (Internal Medicine)      Smoking Status:  Social History     Tobacco Use   Smoking Status Never Smoker   Smokeless Tobacco Never Used       Alcohol Consumption:  Social History     Substance and Sexual Activity   Alcohol Use None       Depression Screen:   PHQ-2/PHQ-9 Depression Screening 7/1/2022   Retired PHQ-9 Total Score -   Retired Total Score -   Little Interest or Pleasure in Doing Things 0-->not at all   Feeling Down, Depressed or Hopeless 1-->several days   PHQ-9: Brief Depression Severity Measure Score 1       Health Habits and Functional and Cognitive Screening:  Functional & Cognitive Status 7/1/2022   Do you have difficulty preparing food and eating? No   Do you have difficulty bathing yourself, getting dressed or grooming yourself? No   Do you have difficulty using the toilet? No   Do you have difficulty moving around from place to place? No   Do you have trouble with steps or getting out of a bed or a chair? No   Current Diet Well Balanced Diet   Dental Exam Up to date        Dental Exam Comment 4/22   Eye Exam Up to date        Eye Exam Comment 9/21   Exercise (times per week) 5 times per week   Current Exercises Include Stationary Bicycling/Spin Class        Exercise Comment strength training   Do you need help using the phone?  No   Are you deaf or do you have serious difficulty hearing?  No   Do you need help with transportation? No   Do you need help shopping? No   Do you need help preparing meals?  No   Do you need help with housework?  No   Do you need help with laundry? No   Do you need help taking your medications? No   Do you need help managing money? No   Do you ever drive or ride in a  car without wearing a seat belt? No   Have you felt unusual stress, anger or loneliness in the last month? No   Who do you live with? Sibling   If you need help, do you have trouble finding someone available to you? No   Have you been bothered in the last four weeks by sexual problems? No   Do you have difficulty concentrating, remembering or making decisions? No       Fall Risk Screen:  RASHAD Fall Risk Assessment was completed, and patient is at LOW risk for falls.Assessment completed on:2022    ACE III MINI   ATTENTION  What is the year: correct  What is the month of the year: correct  What is the day of the week?: correct  What is the date?: correct  MEMORY  Repeat address three times, only score third attempt: [address] [place], [place]: 7  HOW MANY ANIMALS DID THE PATIENT NAME  Verbal Fluency -- Animal Names (0-25): 22+  CLOCK DRAWING  Clock Drawing: All Correct  MEMORY RECALL  Tell me what you remember about that name and address we were repeating at the beginnin  ACE TOTAL SCORE  Total ACE Score - <25/30 strongly suggests cognitive impairment; <21/30 almost certainly shows dementia: 29    Does the patient have evidence of cognitive impairment? No    Aspirin use counseling? Does not need ASA (and currently is not on it)      Recent Lab Results:  CMP:  Lab Results   Component Value Date    BUN 12 2022    CREATININE 0.83 2022    EGFRIFNONA 80 2021    BCR 14.5 2022     2022    K 3.9 2022    CO2 24.0 2022    CALCIUM 9.3 2022    ALBUMIN 4.70 2022    BILITOT 0.7 2022    ALKPHOS 79 2022    AST 28 2022    ALT 30 2022     HbA1c:  No results found for: HGBA1C  Microalbumin:  Lab Results   Component Value Date    MICROALBUR <1.2 2021     Lipid Panel  Lab Results   Component Value Date    CHOL 248 (H) 2021    TRIG 108 2021    HDL 73 (H) 2021     (H) 2021    AST 28  07/01/2022    ALT 30 07/01/2022       Visual Acuity:   Visual Acuity Screening    Right eye Left eye Both eyes   Without correction:      With correction: 20/40 20/25 20/25       Age-appropriate Screening Schedule:  Refer to the list below for future screening recommendations based on patient's age, sex and/or medical conditions. Orders for these recommended tests are listed in the plan section. The patient has been provided with a written plan.    Health Maintenance   Topic Date Due   • ZOSTER VACCINE (1 of 2) Never done   • PAP SMEAR  Never done   • INFLUENZA VACCINE  10/01/2022   • LIPID PANEL  11/29/2022   • MAMMOGRAM  12/15/2023   • DXA SCAN  12/15/2023   • TDAP/TD VACCINES (2 - Td or Tdap) 10/14/2026        Subjective   HPI  Exercises 6 times a week on [place].  She has lost 17 pounds with the [place].    Recently treated Amoxicllin for sinusitis. She has travelled back from [place] to [place], then back to [place]. Started May 13th.  On June 1st, she woke up with fever and congestion.  UTC + flu A.  She then took tamiflu and now has lingering cough.  She reports the cough is productive.  She uses the albuterol and this helps with clearing.    She continues to have some congestion in her chest.    Has had thyroid nodule-treated in [place].    Meri Maxwell is a 66 y.o. female an established patient presenting for a Welcome to Medicare Visit.     CHRONIC CONDITIONS    The following portions of the patient's history were reviewed and updated as appropriate: allergies, current medications, past family history, past medical history, past social history, past surgical history and problem list.    Outpatient Medications Prior to Visit   Medication Sig Dispense Refill   • albuterol sulfate  (90 Base) MCG/ACT inhaler Inhale 2 puffs As Needed. PRN for cough     • amLODIPine (NORVASC) 2.5 MG tablet Take 1 tablet by mouth Daily. 30 tablet 2   • Multiple Vitamin (MULTIVITAMINS PO) Take  by mouth. womens 55 Plus  1 daily     •  Multiple Vitamins-Minerals (EYE VITAMINS PO) Take  by mouth. Macuguard  1 daily     • vitamin D (ERGOCALCIFEROL) 1.25 MG (22794 UT) capsule capsule TAKE 1 CAPSULE BY MOUTH 1 TIME PER WEEK. 12 capsule 1     No facility-administered medications prior to visit.       Patient Active Problem List   Diagnosis   • Encounter for vitamin deficiency screening   • Lipid screening   • Screening for thyroid disorder   • Weight gain   • Hormone replacement therapy   • Mixed hyperlipidemia   • Chest pain   • Overweight   • Thyroid nodule   • Routine medical exam   • Vitamin D deficiency   • Primary hypertension       Advance Care Planning:  ACP discussion was held with the patient during this visit. Patient does not have an advance directive, information provided.    Identification of Risk Factors:  Risk factors include: Advance Directive Discussion.    Review of Systems   Constitutional: Negative for chills, fatigue and fever.   HENT: Negative for congestion, ear pain and sinus pressure.    Respiratory: Positive for cough. Negative for chest tightness, shortness of breath and wheezing.    Cardiovascular: Negative for chest pain, palpitations and leg swelling.   Gastrointestinal: Negative for abdominal pain, blood in stool and constipation.   Skin: Negative for color change.   Allergic/Immunologic: Negative for environmental allergies.   Neurological: Negative for dizziness, speech difficulty and headaches.   Psychiatric/Behavioral: Negative for confusion. The patient is not nervous/anxious.        Compared to one year ago, the patient feels her physical health is better.  Compared to one year ago, the patient feels her mental health is better.    Objective    Physical Exam  Vitals reviewed.   Constitutional:       Appearance: Normal appearance. She is well-developed.   HENT:      Head: Normocephalic and atraumatic.      Right Ear: Hearing, tympanic membrane, ear canal and external ear normal.      Left Ear: Hearing, tympanic  "membrane, ear canal and external ear normal.      Nose: Nose normal.      Mouth/Throat:      Pharynx: Uvula midline.   Eyes:      General: Lids are normal.      Conjunctiva/sclera: Conjunctivae normal.      Pupils: Pupils are equal, round, and reactive to light.   Cardiovascular:      Rate and Rhythm: Normal rate and regular rhythm.      Heart sounds: Normal heart sounds.   Pulmonary:      Effort: Pulmonary effort is normal.      Breath sounds: Normal breath sounds.   Abdominal:      General: Bowel sounds are normal.      Palpations: Abdomen is soft.   Musculoskeletal:         General: Normal range of motion.      Cervical back: Full passive range of motion without pain, normal range of motion and neck supple.   Skin:     General: Skin is warm and dry.   Neurological:      Mental Status: She is alert and oriented to person, place, and time.      Deep Tendon Reflexes: Reflexes are normal and symmetric.   Psychiatric:         Speech: Speech normal.         Behavior: Behavior normal.         Thought Content: Thought content normal.         Judgment: Judgment normal.          Vitals:    07/01/22 1026 07/01/22 1110   BP: 130/84 120/80   BP Location: Left arm    Patient Position: Sitting    Cuff Size: Adult    Pulse: 68    Temp: 98.2 °F (36.8 °C)    TempSrc: Temporal    SpO2: 99%    Weight: 86.2 kg (190 lb)    Height: 180.3 cm (70.98\")    PainSc: 0-No pain        BMI is >= 25 and <30. (Overweight) The following options were offered after discussion;: exercise counseling/recommendations      Procedure     ECG 12 Lead    Date/Time: 7/1/2022 1:08 PM  Performed by: Ricarda Perez PA-C  Authorized by: Ricarda Perez PA-C   Rhythm: sinus rhythm  BPM: 61  Conduction: left anterior fascicular block    Clinical impression: abnormal EKG  Comments: Sinus rhythm with left anterior fascicular block ventricular rate of 61 bpm               Assessment & Plan   Problem List Items Addressed This Visit        Cardiac and " Vasculature    Mixed hyperlipidemia    Overview     Improving.  Continue to eat fresh fruits and veggies.             Relevant Orders    ECG 12 Lead    Primary hypertension    Overview     Currently amlodipine 2.5mg            Relevant Medications    amLODIPine (NORVASC) 2.5 MG tablet    Other Relevant Orders    CBC & Differential (Completed)    Comprehensive Metabolic Panel (Completed)    ECG 12 Lead       Endocrine and Metabolic    Screening for thyroid disorder    Relevant Orders    TSH (Completed)    Vitamin D deficiency    Overview     Levels 24.  Will send in vitamin D 50,000 units weekly for 1 month.  Complete all refills.           Relevant Orders    Vitamin D 25 Hydroxy (Completed)       Health Encounters    Routine medical exam - Primary    Overview     She is current on Covid vaccinations. Initial Medicare wellness appointment 6 months.  Cologuard current.  Bone density scheduled.  Will call and schedule mammogram.  Emphasis on regular cardiovascular exercise.               Patient Self-Management and Personalized Health Advice  The patient has been provided with information about: exercise and preventive services including:   · Annual Wellness Visit (AWV).    Outpatient Encounter Medications as of 7/1/2022   Medication Sig Dispense Refill   • albuterol sulfate  (90 Base) MCG/ACT inhaler Inhale 2 puffs As Needed. PRN for cough     • amLODIPine (NORVASC) 2.5 MG tablet Take 1 tablet by mouth Daily. 30 tablet 2   • Multiple Vitamin (MULTIVITAMINS PO) Take  by mouth. womens 55 Plus  1 daily     • Multiple Vitamins-Minerals (EYE VITAMINS PO) Take  by mouth. Macuguard  1 daily     • vitamin D (ERGOCALCIFEROL) 1.25 MG (23744 UT) capsule capsule TAKE 1 CAPSULE BY MOUTH 1 TIME PER WEEK. 12 capsule 1     No facility-administered encounter medications on file as of 7/1/2022.       Reviewed use of high risk medication in the elderly: yes  Reviewed for potential of harmful drug interactions in the elderly:  "yes    Follow Up:  Return in about 6 months (around 1/1/2023).     Patient Instructions     Critical care medicine: Principles of diagnosis and management in the adult (4th ed., pp. [phone]). Lopez.\"> Mohamud's anesthesia (8th ed., pp. [phone]). Lopez.\">   Advance Directive    Advance directives are legal documents that allow you to make decisions about your health care and medical treatment in case you become unable to communicate for yourself. Advance directives let your wishes be known to family, friends, and health care providers.  Discussing and writing advance directives should happen over time rather than all at once. Advance directives can be changed and updated at any time. There are different types of advance directives, such as:  · Medical power of .  · Living will.  · Do not resuscitate (DNR) order or do not attempt resuscitation (DNAR) order.  Health care proxy and medical power of   A health care proxy is also called a health care agent. This person is appointed to make medical decisions for you when you are unable to make decisions for yourself. Generally, people ask a trusted friend or family member to act as their proxy and represent their preferences. Make sure you have an agreement with your trusted person to act as your proxy. A proxy may have to make a medical decision on your behalf if your wishes are not known.  A medical power of , also called a durable power of  for health care, is a legal document that names your health care proxy. Depending on the laws in your state, the document may need to be:  · Signed.  · Notarized.  · Dated.  · Copied.  · Witnessed.  · Incorporated into your medical record.  You may also want to appoint a trusted person to manage your money in the event you are unable to do so. This is called a durable power of  for finances. It is a separate legal document from the durable power of  for health care. You may " choose your health care proxy or someone different to act as your agent in money matters.  If you do not appoint a proxy, or there is a concern that the proxy is not acting in your best interest, a court may appoint a guardian to act on your behalf.  Living will  A living will is a set of instructions that state your wishes about medical care when you cannot express them yourself. Health care providers should keep a copy of your living will in your medical record. You may want to give a copy to family members or friends. To alert caregivers in case of an emergency, you can place a card in your wallet to let them know that you have a living will and where they can find it. A living will is used if you become:  · Terminally ill.  · Disabled.  · Unable to communicate or make decisions.  The following decisions should be included in your living will:  · To use or not to use life support equipment, such as dialysis machines and breathing machines (ventilators).  · Whether you want a DNR or DNAR order. This tells health care providers not to use cardiopulmonary resuscitation (CPR) if breathing or heartbeat stops.  · To use or not to use tube feeding.  · To be given or not to be given food and fluids.  · Whether you want comfort (palliative) care when the goal becomes comfort rather than a cure.  · Whether you want to donate your organs and tissues.  A living will does not give instructions for distributing your money and property if you should pass away.  DNR or DNAR  A DNR or DNAR order is a request not to have CPR in the event that your heart stops beating or you stop breathing. If a DNR or DNAR order has not been made and shared, a health care provider will try to help any patient whose heart has stopped or who has stopped breathing. If you plan to have surgery, talk with your health care provider about how your DNR or DNAR order will be followed if problems occur.  What if I do not have an advance directive?  Some  states assign family decision makers to act on your behalf if you do not have an advance directive. Each state has its own laws about advance directives. You may want to check with your health care provider, , or state representative about the laws in your state.  Summary  · Advance directives are legal documents that allow you to make decisions about your health care and medical treatment in case you become unable to communicate for yourself.  · The process of discussing and writing advance directives should happen over time. You can change and update advance directives at any time.  · Advance directives may include a medical power of , a living will, and a DNR or DNAR order.  This information is not intended to replace advice given to you by your health care provider. Make sure you discuss any questions you have with your health care provider.  Document Revised: 2021 Document Reviewed: 2021  [email or web address] Patient Education ©  [employer]    Medicare Wellness  Personal Prevention Plan of Service     Date of Office Visit:    Encounter Provider:  Ricarda Perez PA-C  Place of Service:  [facility] INTERNAL MEDICINE  Patient Name: Meri Maxwell  :  1955    As part of the Medicare Wellness portion of your visit today, we are providing you with this personalized preventive plan of services (PPPS). This plan is based upon recommendations of the [place] Preventive Services Task Force (USPSTF) and the Advisory Committee on Immunization Practices (ACIP).    This lists the preventive care services that should be considered, and provides dates of when you are due. Items listed as completed are up-to-date and do not require any further intervention.    Health Maintenance   Topic Date Due   • COLORECTAL CANCER SCREENING  Never done   • ZOSTER VACCINE (1 of 2) Never done   • HEPATITIS C SCREENING  Never done   • ANNUAL WELLNESS VISIT  Never done   • PAP  SMEAR  Never done   • COVID-19 Vaccine (4 - Booster for Moderna series) 02/28/2022   • INFLUENZA VACCINE  10/01/2022   • LIPID PANEL  11/29/2022   • MAMMOGRAM  12/15/2023   • DXA SCAN  12/15/2023   • TDAP/TD VACCINES (2 - Td or Tdap) 10/14/2026       Orders Placed This Encounter   Procedures   • Comprehensive Metabolic Panel     Standing Status:   Future     Standing Expiration Date:   7/1/2023     Order Specific Question:   Release to patient     Answer:   Immediate   • Lipid Panel     Standing Status:   Future     Standing Expiration Date:   7/1/2023   • TSH     Standing Status:   Future     Standing Expiration Date:   7/1/2023     Order Specific Question:   Release to patient     Answer:   Immediate   • Vitamin D 25 Hydroxy     Standing Status:   Future     Standing Expiration Date:   7/1/2023     Order Specific Question:   Release to patient     Answer:   Immediate   • CBC & Differential     Standing Status:   Future     Standing Expiration Date:   7/1/2023     Order Specific Question:   Manual Differential     Answer:   No       No follow-ups on file.        BMI for Adults  What is BMI?  Body mass index (BMI) is a number that is calculated from a person's weight and height. BMI can help estimate how much of a person's weight is composed of fat. BMI does not measure body fat directly. Rather, it is an alternative to procedures that directly measure body fat, which can be difficult and expensive.  BMI can help identify people who may be at higher risk for certain medical problems.  What are BMI measurements used for?  BMI is used as a screening tool to identify possible weight problems. It helps determine whether a person is obese, overweight, a healthy weight, or underweight.  BMI is useful for:  · Identifying a weight problem that may be related to a medical condition or may increase the risk for medical problems.  · Promoting changes, such as changes in diet and exercise, to help reach a healthy weight. BMI  "screening can be repeated to see if these changes are working.  How is BMI calculated?  BMI involves measuring your weight in relation to your height. Both height and weight are measured, and the BMI is calculated from those numbers. This can be done either in English (U.S[place] or metric measurements. Note that charts and online BMI calculators are available to help you find your BMI quickly and easily without having to do these calculations yourself.  To calculate your BMI in English (U.S[place] measurements:    1. Measure your weight in pounds (lb).  2. Multiply the number of pounds by 703.  ? For example, for a person who weighs 180 lb, multiply that number by 703, which equals 126,540.  3. Measure your height in inches. Then multiply that number by itself to get a measurement called \"inches squared.\"  ? For example, for a person who is 70 inches tall, the \"inches squared\" measurement is 70 inches x 70 inches, which equals 4,900 inches squared.  4. Divide the total from step 2 (number of lb x 703) by the total from step 3 (inches squared): 126,540 ÷ 4,900 = 25.8. This is your BMI.    To calculate your BMI in metric measurements:  1. Measure your weight in kilograms (kg).  2. Measure your height in meters (m). Then multiply that number by itself to get a measurement called \"meters squared.\"  ? For example, for a person who is 1.75 m tall, the \"meters squared\" measurement is 1.75 m x 1.75 m, which is equal to 3.1 meters squared.  3. Divide the number of kilograms (your weight) by the meters squared number. In this example: 70 ÷ 3.1 = 22.6. This is your BMI.  What do the results mean?  BMI charts are used to identify whether you are underweight, normal weight, overweight, or obese. The following guidelines will be used:  · Underweight: BMI less than 18.5.  · Normal weight: BMI between 18.5 and 24.9.  · Overweight: BMI between 25 and 29.9.  · Obese: BMI of 30 or above.  Keep these notes in mind:  · Weight includes both fat " and muscle, so someone with a muscular build, such as an athlete, may have a BMI that is higher than 24.9. In cases like these, BMI is not an accurate measure of body fat.  · To determine if excess body fat is the cause of a BMI of 25 or higher, further assessments may need to be done by a health care provider.  · BMI is usually interpreted in the same way for men and women.  Where to find more information  For more information about BMI, including tools to quickly calculate your BMI, go to these websites:  · Centers for Disease Control and Prevention: www.cdc.gov  · American Heart Association: www.heart.org  · National Heart, Lung, and Blood [place]: www.nhlbi.nih.gov  Summary  · Body mass index (BMI) is a number that is calculated from a person's weight and height.  · BMI may help estimate how much of a person's weight is composed of fat. BMI can help identify those who may be at higher risk for certain medical problems.  · BMI can be measured using English measurements or metric measurements.  · BMI charts are used to identify whether you are underweight, normal weight, overweight, or obese.  This information is not intended to replace advice given to you by your health care provider. Make sure you discuss any questions you have with your health care provider.  Document Revised: 09/09/2020 Document Reviewed: 07/17/2020  [employer] Patient Education © 2021 [employer]        An After Visit Summary and PPPS with all of these plans were given to the patient.

## 2022-08-14 DIAGNOSIS — R03.0 ELEVATED BLOOD PRESSURE READING: ICD-10-CM

## 2022-08-15 RX ORDER — AMLODIPINE BESYLATE 2.5 MG/1
TABLET ORAL
Qty: 90 TABLET | Refills: 1 | Status: SHIPPED | OUTPATIENT
Start: 2022-08-15 | End: 2022-11-28 | Stop reason: SDUPTHER

## 2022-11-28 DIAGNOSIS — R03.0 ELEVATED BLOOD PRESSURE READING: ICD-10-CM

## 2022-11-28 RX ORDER — AMLODIPINE BESYLATE 2.5 MG/1
2.5 TABLET ORAL DAILY
Qty: 90 TABLET | Refills: 1 | Status: SHIPPED | OUTPATIENT
Start: 2022-11-28

## 2023-05-16 ENCOUNTER — LAB (OUTPATIENT)
Dept: LAB | Facility: HOSPITAL | Age: 68
End: 2023-05-16
Payer: MEDICARE

## 2023-05-16 ENCOUNTER — OFFICE VISIT (OUTPATIENT)
Dept: INTERNAL MEDICINE | Facility: CLINIC | Age: 68
End: 2023-05-16
Payer: MEDICARE

## 2023-05-16 VITALS
HEART RATE: 67 BPM | DIASTOLIC BLOOD PRESSURE: 88 MMHG | TEMPERATURE: 97.8 F | SYSTOLIC BLOOD PRESSURE: 138 MMHG | HEIGHT: 71 IN | WEIGHT: 199 LBS | OXYGEN SATURATION: 98 % | BODY MASS INDEX: 27.86 KG/M2

## 2023-05-16 DIAGNOSIS — E78.2 MIXED HYPERLIPIDEMIA: ICD-10-CM

## 2023-05-16 DIAGNOSIS — I10 PRIMARY HYPERTENSION: Primary | ICD-10-CM

## 2023-05-16 DIAGNOSIS — R63.5 WEIGHT GAIN: ICD-10-CM

## 2023-05-16 DIAGNOSIS — Z13.29 SCREENING FOR THYROID DISORDER: ICD-10-CM

## 2023-05-16 DIAGNOSIS — E55.9 VITAMIN D DEFICIENCY: ICD-10-CM

## 2023-05-16 DIAGNOSIS — I10 PRIMARY HYPERTENSION: ICD-10-CM

## 2023-05-16 DIAGNOSIS — Z12.11 COLON CANCER SCREENING: ICD-10-CM

## 2023-05-16 LAB
25(OH)D3 SERPL-MCNC: 41.7 NG/ML (ref 30–100)
ALBUMIN SERPL-MCNC: 4.6 G/DL (ref 3.5–5.2)
ALBUMIN UR-MCNC: <1.2 MG/DL
ALBUMIN/GLOB SERPL: 1.4 G/DL
ALP SERPL-CCNC: 75 U/L (ref 39–117)
ALT SERPL W P-5'-P-CCNC: 25 U/L (ref 1–33)
ANION GAP SERPL CALCULATED.3IONS-SCNC: 10 MMOL/L (ref 5–15)
AST SERPL-CCNC: 27 U/L (ref 1–32)
BASOPHILS # BLD AUTO: 0.07 10*3/MM3 (ref 0–0.2)
BASOPHILS NFR BLD AUTO: 1.2 % (ref 0–1.5)
BILIRUB SERPL-MCNC: 0.6 MG/DL (ref 0–1.2)
BUN SERPL-MCNC: 11 MG/DL (ref 8–23)
BUN/CREAT SERPL: 13.8 (ref 7–25)
CALCIUM SPEC-SCNC: 10.2 MG/DL (ref 8.6–10.5)
CHLORIDE SERPL-SCNC: 104 MMOL/L (ref 98–107)
CHOLEST SERPL-MCNC: 218 MG/DL (ref 0–200)
CO2 SERPL-SCNC: 27 MMOL/L (ref 22–29)
CREAT SERPL-MCNC: 0.8 MG/DL (ref 0.57–1)
DEPRECATED RDW RBC AUTO: 41.8 FL (ref 37–54)
EGFRCR SERPLBLD CKD-EPI 2021: 81.4 ML/MIN/1.73
EOSINOPHIL # BLD AUTO: 0.18 10*3/MM3 (ref 0–0.4)
EOSINOPHIL NFR BLD AUTO: 3.1 % (ref 0.3–6.2)
ERYTHROCYTE [DISTWIDTH] IN BLOOD BY AUTOMATED COUNT: 12 % (ref 12.3–15.4)
GLOBULIN UR ELPH-MCNC: 3.4 GM/DL
GLUCOSE SERPL-MCNC: 79 MG/DL (ref 65–99)
HCT VFR BLD AUTO: 40.8 % (ref 34–46.6)
HDLC SERPL-MCNC: 68 MG/DL (ref 40–60)
HGB BLD-MCNC: 14.3 G/DL (ref 12–15.9)
IMM GRANULOCYTES # BLD AUTO: 0.01 10*3/MM3 (ref 0–0.05)
IMM GRANULOCYTES NFR BLD AUTO: 0.2 % (ref 0–0.5)
LDLC SERPL CALC-MCNC: 139 MG/DL (ref 0–100)
LDLC/HDLC SERPL: 2.02 {RATIO}
LYMPHOCYTES # BLD AUTO: 1.41 10*3/MM3 (ref 0.7–3.1)
LYMPHOCYTES NFR BLD AUTO: 24.4 % (ref 19.6–45.3)
MCH RBC QN AUTO: 33.3 PG (ref 26.6–33)
MCHC RBC AUTO-ENTMCNC: 35 G/DL (ref 31.5–35.7)
MCV RBC AUTO: 95.1 FL (ref 79–97)
MONOCYTES # BLD AUTO: 0.4 10*3/MM3 (ref 0.1–0.9)
MONOCYTES NFR BLD AUTO: 6.9 % (ref 5–12)
NEUTROPHILS NFR BLD AUTO: 3.72 10*3/MM3 (ref 1.7–7)
NEUTROPHILS NFR BLD AUTO: 64.2 % (ref 42.7–76)
NRBC BLD AUTO-RTO: 0 /100 WBC (ref 0–0.2)
PLATELET # BLD AUTO: 233 10*3/MM3 (ref 140–450)
PMV BLD AUTO: 10.8 FL (ref 6–12)
POTASSIUM SERPL-SCNC: 4.8 MMOL/L (ref 3.5–5.2)
PROT SERPL-MCNC: 8 G/DL (ref 6–8.5)
RBC # BLD AUTO: 4.29 10*6/MM3 (ref 3.77–5.28)
SODIUM SERPL-SCNC: 141 MMOL/L (ref 136–145)
T3FREE SERPL-MCNC: 3.26 PG/ML (ref 2–4.4)
T4 FREE SERPL-MCNC: 1.2 NG/DL (ref 0.93–1.7)
TRIGL SERPL-MCNC: 62 MG/DL (ref 0–150)
TSH SERPL DL<=0.05 MIU/L-ACNC: 1.44 UIU/ML (ref 0.27–4.2)
VIT B12 BLD-MCNC: 789 PG/ML (ref 211–946)
VLDLC SERPL-MCNC: 11 MG/DL (ref 5–40)
WBC NRBC COR # BLD: 5.79 10*3/MM3 (ref 3.4–10.8)

## 2023-05-16 PROCEDURE — 82043 UR ALBUMIN QUANTITATIVE: CPT

## 2023-05-16 PROCEDURE — 85025 COMPLETE CBC W/AUTO DIFF WBC: CPT

## 2023-05-16 PROCEDURE — 84439 ASSAY OF FREE THYROXINE: CPT

## 2023-05-16 PROCEDURE — 82306 VITAMIN D 25 HYDROXY: CPT

## 2023-05-16 PROCEDURE — 80053 COMPREHEN METABOLIC PANEL: CPT

## 2023-05-16 PROCEDURE — 80061 LIPID PANEL: CPT

## 2023-05-16 PROCEDURE — 82607 VITAMIN B-12: CPT

## 2023-05-16 PROCEDURE — 99214 OFFICE O/P EST MOD 30 MIN: CPT | Performed by: PHYSICIAN ASSISTANT

## 2023-05-16 PROCEDURE — 3075F SYST BP GE 130 - 139MM HG: CPT | Performed by: PHYSICIAN ASSISTANT

## 2023-05-16 PROCEDURE — 84481 FREE ASSAY (FT-3): CPT

## 2023-05-16 PROCEDURE — 84443 ASSAY THYROID STIM HORMONE: CPT

## 2023-05-16 PROCEDURE — 3079F DIAST BP 80-89 MM HG: CPT | Performed by: PHYSICIAN ASSISTANT
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
## 2023-08-26 DIAGNOSIS — R03.0 ELEVATED BLOOD PRESSURE READING: ICD-10-CM

## 2023-08-28 RX ORDER — AMLODIPINE BESYLATE 2.5 MG/1
TABLET ORAL
Qty: 90 TABLET | Refills: 1 | Status: SHIPPED | OUTPATIENT
Start: 2023-08-28

## 2023-09-28 ENCOUNTER — PATIENT MESSAGE (OUTPATIENT)
Dept: INTERNAL MEDICINE | Facility: CLINIC | Age: 68
End: 2023-09-28
Payer: MEDICARE

## 2023-09-28 DIAGNOSIS — Z12.11 COLON CANCER SCREENING: Primary | ICD-10-CM

## 2023-10-06 NOTE — TELEPHONE ENCOUNTER
From: Meri Maxwell  Sent: 10/6/2023 9:06 AM EDT  To: Krystal Barry [facility] [address] [facility] [address]  Subject: Cologuard     Good morning! Just following up on this. Has a kit been sent to my [place] address? Thanks!

## 2023-10-21 LAB — NONINV COLON CA DNA+OCC BLD SCRN STL QL: NEGATIVE

## 2024-03-01 DIAGNOSIS — R03.0 ELEVATED BLOOD PRESSURE READING: ICD-10-CM

## 2024-03-01 RX ORDER — AMLODIPINE BESYLATE 2.5 MG/1
TABLET ORAL
Qty: 90 TABLET | Refills: 1 | Status: SHIPPED | OUTPATIENT
Start: 2024-03-01

## 2024-03-01 NOTE — TELEPHONE ENCOUNTER
Rx Refill Note  Requested Prescriptions     Pending Prescriptions Disp Refills    amLODIPine (NORVASC) 2.5 MG tablet [Pharmacy Med Name: AMLODIPINE BESYLATE 2.5MG TABLETS] 90 tablet 1     Sig: TAKE 1 TABLET BY MOUTH EVERY DAY      Last office visit with prescribing clinician: 5/16/2023   Last telemedicine visit with prescribing clinician: Visit date not found   Next office visit with prescribing clinician: Visit date not found                         Would you like a call back once the refill request has been completed: [] Yes [] No    If the office needs to give you a call back, can they leave a voicemail: [] Yes [] No    Mera Blandon LPN  03/01/24, 09:27 EST

## 2024-09-08 DIAGNOSIS — R03.0 ELEVATED BLOOD PRESSURE READING: ICD-10-CM

## 2024-09-09 NOTE — TELEPHONE ENCOUNTER
Rx Refill Note  Requested Prescriptions     Pending Prescriptions Disp Refills    amLODIPine (NORVASC) 2.5 MG tablet [Pharmacy Med Name: AMLODIPINE BESYLATE 2.5MG TABLETS] 90 tablet 1     Sig: TAKE 1 TABLET BY MOUTH EVERY DAY.      Last office visit with prescribing clinician: 5/16/2023   Last telemedicine visit with prescribing clinician: Visit date not found   Next office visit with prescribing clinician: Visit date not found                         Would you like a call back once the refill request has been completed: [] Yes [] No    If the office needs to give you a call back, can they leave a voicemail: [] Yes [] No    Mera Blandon LPN  09/09/24, 08:39 EDT

## 2024-09-10 RX ORDER — AMLODIPINE BESYLATE 2.5 MG/1
TABLET ORAL
Qty: 90 TABLET | Refills: 1 | OUTPATIENT
Start: 2024-09-10

## 2024-09-10 NOTE — TELEPHONE ENCOUNTER
SPOKE WITH PT.  PT HAS MOVED TO [place] AND HAS REACHED OUT TO HER PCP IN [place].  I WILL REMOVE ARIES BEARD AS HER PCP.